# Patient Record
Sex: MALE | Race: WHITE | Employment: OTHER | URBAN - METROPOLITAN AREA
[De-identification: names, ages, dates, MRNs, and addresses within clinical notes are randomized per-mention and may not be internally consistent; named-entity substitution may affect disease eponyms.]

---

## 2022-12-10 ENCOUNTER — APPOINTMENT (OUTPATIENT)
Dept: GENERAL RADIOLOGY | Age: 72
DRG: 247 | End: 2022-12-10
Attending: EMERGENCY MEDICINE
Payer: MEDICARE

## 2022-12-10 ENCOUNTER — HOSPITAL ENCOUNTER (INPATIENT)
Age: 72
LOS: 4 days | Discharge: HOME | DRG: 247 | End: 2022-12-14
Attending: EMERGENCY MEDICINE | Admitting: INTERNAL MEDICINE
Payer: MEDICARE

## 2022-12-10 DIAGNOSIS — R07.9 CHEST PAIN, UNSPECIFIED TYPE: Primary | ICD-10-CM

## 2022-12-10 DIAGNOSIS — I20.0 UNSTABLE ANGINA (HCC): ICD-10-CM

## 2022-12-10 DIAGNOSIS — I35.0 NONRHEUMATIC AORTIC VALVE STENOSIS: ICD-10-CM

## 2022-12-10 DIAGNOSIS — I10 BENIGN ESSENTIAL HTN: ICD-10-CM

## 2022-12-10 DIAGNOSIS — E11.8 DM TYPE 2, CONTROLLED, WITH COMPLICATION (HCC): ICD-10-CM

## 2022-12-10 DIAGNOSIS — R94.39 ABNORMAL STRESS TEST: ICD-10-CM

## 2022-12-10 LAB
ALBUMIN SERPL-MCNC: 3.8 G/DL (ref 3.5–5)
ALBUMIN/GLOB SERPL: 0.9 {RATIO} (ref 1.1–2.2)
ALP SERPL-CCNC: 79 U/L (ref 45–117)
ALT SERPL-CCNC: 18 U/L (ref 12–78)
ANION GAP SERPL CALC-SCNC: 8 MMOL/L (ref 5–15)
AST SERPL-CCNC: 20 U/L (ref 15–37)
BASOPHILS # BLD: 0 K/UL (ref 0–0.1)
BASOPHILS NFR BLD: 0 % (ref 0–1)
BILIRUB SERPL-MCNC: 0.3 MG/DL (ref 0.2–1)
BUN SERPL-MCNC: 29 MG/DL (ref 6–20)
BUN/CREAT SERPL: 15 (ref 12–20)
CALCIUM SERPL-MCNC: 8.7 MG/DL (ref 8.5–10.1)
CHLORIDE SERPL-SCNC: 102 MMOL/L (ref 97–108)
CO2 SERPL-SCNC: 27 MMOL/L (ref 21–32)
CREAT SERPL-MCNC: 1.92 MG/DL (ref 0.7–1.3)
DIFFERENTIAL METHOD BLD: ABNORMAL
EOSINOPHIL # BLD: 0.1 K/UL (ref 0–0.4)
EOSINOPHIL NFR BLD: 2 % (ref 0–7)
ERYTHROCYTE [DISTWIDTH] IN BLOOD BY AUTOMATED COUNT: 13.3 % (ref 11.5–14.5)
GLOBULIN SER CALC-MCNC: 4.2 G/DL (ref 2–4)
GLUCOSE BLD STRIP.AUTO-MCNC: 109 MG/DL (ref 65–117)
GLUCOSE BLD STRIP.AUTO-MCNC: 181 MG/DL (ref 65–117)
GLUCOSE SERPL-MCNC: 176 MG/DL (ref 65–100)
HCT VFR BLD AUTO: 34 % (ref 36.6–50.3)
HGB BLD-MCNC: 11.3 G/DL (ref 12.1–17)
IMM GRANULOCYTES # BLD AUTO: 0 K/UL (ref 0–0.04)
IMM GRANULOCYTES NFR BLD AUTO: 0 % (ref 0–0.5)
LYMPHOCYTES # BLD: 1.3 K/UL (ref 0.8–3.5)
LYMPHOCYTES NFR BLD: 26 % (ref 12–49)
MCH RBC QN AUTO: 31 PG (ref 26–34)
MCHC RBC AUTO-ENTMCNC: 33.2 G/DL (ref 30–36.5)
MCV RBC AUTO: 93.4 FL (ref 80–99)
MONOCYTES # BLD: 0.6 K/UL (ref 0–1)
MONOCYTES NFR BLD: 11 % (ref 5–13)
NEUTS SEG # BLD: 3.1 K/UL (ref 1.8–8)
NEUTS SEG NFR BLD: 61 % (ref 32–75)
NRBC # BLD: 0 K/UL (ref 0–0.01)
NRBC BLD-RTO: 0 PER 100 WBC
PLATELET # BLD AUTO: 266 K/UL (ref 150–400)
PMV BLD AUTO: 10 FL (ref 8.9–12.9)
POTASSIUM SERPL-SCNC: 4.3 MMOL/L (ref 3.5–5.1)
PROT SERPL-MCNC: 8 G/DL (ref 6.4–8.2)
RBC # BLD AUTO: 3.64 M/UL (ref 4.1–5.7)
SERVICE CMNT-IMP: ABNORMAL
SERVICE CMNT-IMP: NORMAL
SODIUM SERPL-SCNC: 137 MMOL/L (ref 136–145)
TROPONIN-HIGH SENSITIVITY: 21 NG/L (ref 0–76)
TROPONIN-HIGH SENSITIVITY: 25 NG/L (ref 0–76)
WBC # BLD AUTO: 5.2 K/UL (ref 4.1–11.1)

## 2022-12-10 PROCEDURE — 93005 ELECTROCARDIOGRAM TRACING: CPT

## 2022-12-10 PROCEDURE — 80053 COMPREHEN METABOLIC PANEL: CPT

## 2022-12-10 PROCEDURE — 74011000250 HC RX REV CODE- 250: Performed by: STUDENT IN AN ORGANIZED HEALTH CARE EDUCATION/TRAINING PROGRAM

## 2022-12-10 PROCEDURE — 71045 X-RAY EXAM CHEST 1 VIEW: CPT

## 2022-12-10 PROCEDURE — 99285 EMERGENCY DEPT VISIT HI MDM: CPT

## 2022-12-10 PROCEDURE — 65270000046 HC RM TELEMETRY

## 2022-12-10 PROCEDURE — 82962 GLUCOSE BLOOD TEST: CPT

## 2022-12-10 PROCEDURE — 84484 ASSAY OF TROPONIN QUANT: CPT

## 2022-12-10 PROCEDURE — 36415 COLL VENOUS BLD VENIPUNCTURE: CPT

## 2022-12-10 PROCEDURE — 85025 COMPLETE CBC W/AUTO DIFF WBC: CPT

## 2022-12-10 RX ORDER — POLYETHYLENE GLYCOL 3350 17 G/17G
17 POWDER, FOR SOLUTION ORAL DAILY PRN
Status: DISCONTINUED | OUTPATIENT
Start: 2022-12-10 | End: 2022-12-14 | Stop reason: HOSPADM

## 2022-12-10 RX ORDER — SODIUM CHLORIDE 9 MG/ML
125 INJECTION, SOLUTION INTRAVENOUS CONTINUOUS
Status: DISCONTINUED | OUTPATIENT
Start: 2022-12-10 | End: 2022-12-14

## 2022-12-10 RX ORDER — NITROGLYCERIN 0.4 MG/1
0.4 TABLET SUBLINGUAL
Status: DISPENSED | OUTPATIENT
Start: 2022-12-10 | End: 2022-12-11

## 2022-12-10 RX ORDER — METFORMIN HYDROCHLORIDE 500 MG/1
TABLET ORAL 2 TIMES DAILY WITH MEALS
COMMUNITY

## 2022-12-10 RX ORDER — GUAIFENESIN 100 MG/5ML
324 LIQUID (ML) ORAL
Status: DISPENSED | OUTPATIENT
Start: 2022-12-10 | End: 2022-12-11

## 2022-12-10 RX ORDER — SODIUM CHLORIDE 0.9 % (FLUSH) 0.9 %
5-40 SYRINGE (ML) INJECTION AS NEEDED
Status: DISCONTINUED | OUTPATIENT
Start: 2022-12-10 | End: 2022-12-14 | Stop reason: HOSPADM

## 2022-12-10 RX ORDER — GLIPIZIDE 5 MG/1
TABLET, FILM COATED, EXTENDED RELEASE ORAL DAILY
COMMUNITY

## 2022-12-10 RX ORDER — AMLODIPINE BESYLATE 5 MG/1
5 TABLET ORAL DAILY
Status: DISCONTINUED | OUTPATIENT
Start: 2022-12-11 | End: 2022-12-14 | Stop reason: HOSPADM

## 2022-12-10 RX ORDER — AMLODIPINE BESYLATE 5 MG/1
5 TABLET ORAL DAILY
COMMUNITY

## 2022-12-10 RX ORDER — RAMIPRIL 5 MG/1
10 CAPSULE ORAL DAILY
COMMUNITY
End: 2022-12-14

## 2022-12-10 RX ORDER — GUAIFENESIN 100 MG/5ML
81 LIQUID (ML) ORAL DAILY
COMMUNITY

## 2022-12-10 RX ORDER — CYANOCOBALAMIN 1000 UG/ML
1000 INJECTION, SOLUTION INTRAMUSCULAR; SUBCUTANEOUS ONCE
COMMUNITY

## 2022-12-10 RX ORDER — ONDANSETRON 2 MG/ML
4 INJECTION INTRAMUSCULAR; INTRAVENOUS
Status: DISCONTINUED | OUTPATIENT
Start: 2022-12-10 | End: 2022-12-14 | Stop reason: HOSPADM

## 2022-12-10 RX ORDER — HYDROCHLOROTHIAZIDE 25 MG/1
25 TABLET ORAL DAILY
COMMUNITY
End: 2022-12-14

## 2022-12-10 RX ORDER — TAMSULOSIN HYDROCHLORIDE 0.4 MG/1
0.4 CAPSULE ORAL DAILY
COMMUNITY

## 2022-12-10 RX ORDER — SODIUM CHLORIDE 0.9 % (FLUSH) 0.9 %
5-40 SYRINGE (ML) INJECTION EVERY 8 HOURS
Status: DISCONTINUED | OUTPATIENT
Start: 2022-12-10 | End: 2022-12-14 | Stop reason: HOSPADM

## 2022-12-10 RX ORDER — GUAIFENESIN 100 MG/5ML
81 LIQUID (ML) ORAL DAILY
Status: DISCONTINUED | OUTPATIENT
Start: 2022-12-11 | End: 2022-12-14 | Stop reason: HOSPADM

## 2022-12-10 RX ORDER — ONDANSETRON 4 MG/1
4 TABLET, ORALLY DISINTEGRATING ORAL
Status: DISCONTINUED | OUTPATIENT
Start: 2022-12-10 | End: 2022-12-14 | Stop reason: HOSPADM

## 2022-12-10 RX ORDER — MAGNESIUM SULFATE 100 %
4 CRYSTALS MISCELLANEOUS AS NEEDED
Status: DISCONTINUED | OUTPATIENT
Start: 2022-12-10 | End: 2022-12-14 | Stop reason: HOSPADM

## 2022-12-10 RX ORDER — EZETIMIBE 10 MG/1
10 TABLET ORAL DAILY
Status: DISCONTINUED | OUTPATIENT
Start: 2022-12-11 | End: 2022-12-14 | Stop reason: HOSPADM

## 2022-12-10 RX ORDER — ACETAMINOPHEN 650 MG/1
650 SUPPOSITORY RECTAL
Status: DISCONTINUED | OUTPATIENT
Start: 2022-12-10 | End: 2022-12-14 | Stop reason: HOSPADM

## 2022-12-10 RX ORDER — TAMSULOSIN HYDROCHLORIDE 0.4 MG/1
0.4 CAPSULE ORAL DAILY
Status: DISCONTINUED | OUTPATIENT
Start: 2022-12-11 | End: 2022-12-14 | Stop reason: HOSPADM

## 2022-12-10 RX ORDER — INSULIN LISPRO 100 [IU]/ML
INJECTION, SOLUTION INTRAVENOUS; SUBCUTANEOUS
Status: DISCONTINUED | OUTPATIENT
Start: 2022-12-10 | End: 2022-12-14 | Stop reason: HOSPADM

## 2022-12-10 RX ORDER — PIOGLITAZONEHYDROCHLORIDE 45 MG/1
TABLET ORAL DAILY
COMMUNITY
End: 2022-12-14

## 2022-12-10 RX ORDER — EZETIMIBE 10 MG/1
TABLET ORAL
COMMUNITY

## 2022-12-10 RX ORDER — ENOXAPARIN SODIUM 100 MG/ML
40 INJECTION SUBCUTANEOUS DAILY
Status: DISCONTINUED | OUTPATIENT
Start: 2022-12-11 | End: 2022-12-11

## 2022-12-10 RX ORDER — ACETAMINOPHEN 325 MG/1
650 TABLET ORAL
Status: DISCONTINUED | OUTPATIENT
Start: 2022-12-10 | End: 2022-12-14 | Stop reason: HOSPADM

## 2022-12-10 RX ADMIN — SODIUM CHLORIDE, PRESERVATIVE FREE 10 ML: 5 INJECTION INTRAVENOUS at 22:51

## 2022-12-10 NOTE — ED TRIAGE NOTES
Patient arrives with c/o chest pain early in the week, another one yesterday and one today that radiates to both arms. Patient had ECHO Thursday but does not have results yet. + SOB when the pain comes on with dizziness. <<-----Click on this checkbox to enter Post-Op Dx

## 2022-12-10 NOTE — ED NOTES
Patient and wife state that patient took \"1 baby aspirin this morning\" and \"full dose aspirin around 145pm, right before coming here\". Patient denies Chest pain at this time. Nitro and ASA held.

## 2022-12-10 NOTE — ED PROVIDER NOTES
72M w/ hx HTN and DM p/w 1d chest pain. Pt reports 1d of left sided chest tightness radiating to both arms associated w/ nausea and lightheadedness that started while doing yardwork. Also having dyspnea. Currently has mild 2/10 pain. No F/C, cough, V/D, syncope. Denies any prior cardiac hx. No recent surgeries, hospitalizations, travel, hx of malignancy, exogenous estrogen use, hemoptysis, LE pain/swelling, or hx of PE/DVT. No hx of smoking. At home took a full dose as       Past Medical History:   Diagnosis Date    Diabetes ([facility], [place] [address]     Hepatitis 1959    not active    Hypertension     Kidney stones     Malaria 1969    while serving in [facility]       Past Surgical History:   Procedure Laterality Date    HX KNEE REPLACEMENT Right 03/05/2019    Dr. aKela Hart         History reviewed. No pertinent family history. Social History     Socioeconomic History    Marital status:      Spouse name: Not on file    Number of children: Not on file    Years of education: Not on file    Highest education level: Not on file   Occupational History    Not on file   Tobacco Use    Smoking status: Never    Smokeless tobacco: Never   Substance and Sexual Activity    Alcohol use: Never    Drug use: Never    Sexual activity: Not on file   Other Topics Concern    Not on file   Social History Narrative    Not on file     Social Determinants of Health     Financial Resource Strain: Not on file   Food Insecurity: Not on file   Transportation Needs: Not on file   Physical Activity: Not on file   Stress: Not on file   Social Connections: Not on file   Intimate Partner Violence: Not on file   Housing Stability: Not on file         ALLERGIES: Patient has no known allergies. Review of Systems   Constitutional:  Negative for chills, diaphoresis and fever. HENT:  Negative for facial swelling, mouth sores, nosebleeds, trouble swallowing and voice change. Eyes:  Negative for pain and visual disturbance.    Respiratory:  Positive for shortness of breath. Negative for apnea, cough, wheezing and stridor. Cardiovascular:  Positive for chest pain. Negative for palpitations and leg swelling. Gastrointestinal:  Positive for nausea. Negative for abdominal distention, abdominal pain, blood in stool, diarrhea and vomiting. Genitourinary:  Negative for difficulty urinating, dysuria, flank pain, hematuria, scrotal swelling and testicular pain. Musculoskeletal:  Negative for joint swelling. Skin:  Negative for color change and rash. Allergic/Immunologic: Negative for immunocompromised state. Neurological:  Negative for dizziness, syncope and light-headedness. Hematological:  Does not bruise/bleed easily. Psychiatric/Behavioral:  Negative for agitation and behavioral problems. Vitals:    12/11/22 0300 12/11/22 0400 12/11/22 0600 12/11/22 0705   BP: (!) 149/70   (!) 121/46   Pulse: 78 80 (!) 3 81   Resp: 18   18   Temp: 97.7 °F (36.5 °C)   97.7 °F (36.5 °C)   SpO2: 94%   95%   Weight:                Physical Exam  Vitals and nursing note reviewed. Constitutional:       General: He is not in acute distress. Appearance: Normal appearance. He is not ill-appearing or toxic-appearing. HENT:      Head: Normocephalic and atraumatic. Right Ear: External ear normal.      Left Ear: External ear normal.      Nose: Nose normal.      Mouth/Throat:      Mouth: Mucous membranes are moist.      Pharynx: Oropharynx is clear. No oropharyngeal exudate or posterior oropharyngeal erythema. Eyes:      General: No scleral icterus. Extraocular Movements: Extraocular movements intact. Conjunctiva/sclera: Conjunctivae normal.      Pupils: Pupils are equal, round, and reactive to light. Cardiovascular:      Rate and Rhythm: Normal rate and regular rhythm. Pulses: Normal pulses. Heart sounds: No murmur heard. No friction rub. No gallop. Pulmonary:      Effort: Pulmonary effort is normal. No respiratory distress. Breath sounds: Normal breath sounds. No stridor. No wheezing, rhonchi or rales. Abdominal:      General: Bowel sounds are normal. There is no distension. Palpations: Abdomen is soft. Tenderness: There is no abdominal tenderness. There is no guarding or rebound. Musculoskeletal:         General: No deformity. Normal range of motion. Cervical back: Normal range of motion and neck supple. No rigidity. Right lower leg: No edema. Left lower leg: No edema. Skin:     General: Skin is warm. Capillary Refill: Capillary refill takes less than 2 seconds. Coloration: Skin is not jaundiced. Neurological:      General: No focal deficit present. Mental Status: He is alert. Cranial Nerves: No cranial nerve deficit. Sensory: No sensory deficit. Motor: No weakness. Coordination: Coordination normal.   Psychiatric:         Mood and Affect: Mood normal.         Behavior: Behavior normal.         Thought Content:  Thought content normal.         Judgment: Judgment normal.        EKG Interpretation   SR, narrow QRS, nl intervals, no EDGAR but inferior-lateral STD w/ TWI  (EKG tracing interpreted by ED physician)    EKG Interpretation  SR, narrow QRS, nl intervals, no EDGAR or EDGAR    EKG tracing interpreted by ED physician      LABORATORY TESTS:  Admission on 12/10/2022   Component Date Value Ref Range Status    Ventricular Rate 12/10/2022 91  BPM Preliminary    Atrial Rate 12/10/2022 92  BPM Preliminary    P-R Interval 12/10/2022 140  ms Preliminary    QRS Duration 12/10/2022 96  ms Preliminary    Q-T Interval 12/10/2022 336  ms Preliminary    QTC Calculation (Bezet) 12/10/2022 413  ms Preliminary    Calculated P Axis 12/10/2022 64  degrees Preliminary    Calculated R Axis 12/10/2022 27  degrees Preliminary    Calculated T Axis 12/10/2022 136  degrees Preliminary    Diagnosis 12/10/2022    Preliminary                    Value:Normal sinus rhythm  Cannot rule out Inferior infarct , age undetermined  ST & T wave abnormality, consider lateral ischemia  Abnormal ECG  No previous ECGs available      WBC 12/10/2022 5.2  4.1 - 11.1 K/uL Final    RBC 12/10/2022 3.64 (A)  4.10 - 5.70 M/uL Final    HGB 12/10/2022 11.3 (A)  12.1 - 17.0 g/dL Final    HCT 12/10/2022 34.0 (A)  36.6 - 50.3 % Final    MCV 12/10/2022 93.4  80.0 - 99.0 FL Final    MCH 12/10/2022 31.0  26.0 - 34.0 PG Final    MCHC 12/10/2022 33.2  30.0 - 36.5 g/dL Final    RDW 12/10/2022 13.3  11.5 - 14.5 % Final    PLATELET 25/39/7769 830  150 - 400 K/uL Final    MPV 12/10/2022 10.0  8.9 - 12.9 FL Final    NRBC 12/10/2022 0.0  0  WBC Final    ABSOLUTE NRBC 12/10/2022 0.00  0.00 - 0.01 K/uL Final    NEUTROPHILS 12/10/2022 61  32 - 75 % Final    LYMPHOCYTES 12/10/2022 26  12 - 49 % Final    MONOCYTES 12/10/2022 11  5 - 13 % Final    EOSINOPHILS 12/10/2022 2  0 - 7 % Final    BASOPHILS 12/10/2022 0  0 - 1 % Final    IMMATURE GRANULOCYTES 12/10/2022 0  0.0 - 0.5 % Final    ABS. NEUTROPHILS 12/10/2022 3.1  1.8 - 8.0 K/UL Final    ABS. LYMPHOCYTES 12/10/2022 1.3  0.8 - 3.5 K/UL Final    ABS. MONOCYTES 12/10/2022 0.6  0.0 - 1.0 K/UL Final    ABS. EOSINOPHILS 12/10/2022 0.1  0.0 - 0.4 K/UL Final    ABS. BASOPHILS 12/10/2022 0.0  0.0 - 0.1 K/UL Final    ABS. IMM. GRANS.  12/10/2022 0.0  0.00 - 0.04 K/UL Final    DF 12/10/2022 AUTOMATED    Final    Sodium 12/10/2022 137  136 - 145 mmol/L Final    Potassium 12/10/2022 4.3  3.5 - 5.1 mmol/L Final    Chloride 12/10/2022 102  97 - 108 mmol/L Final    CO2 12/10/2022 27  21 - 32 mmol/L Final    Anion gap 12/10/2022 8  5 - 15 mmol/L Final    Glucose 12/10/2022 176 (A)  65 - 100 mg/dL Final    BUN 12/10/2022 29 (A)  6 - 20 MG/DL Final    Creatinine 12/10/2022 1.92 (A)  0.70 - 1.30 MG/DL Final    BUN/Creatinine ratio 12/10/2022 15  12 - 20   Final    eGFR 12/10/2022 36 (A)  >60 ml/min/1.73m2 Final    Comment:      Pediatric calculator link: Bela.at. org/professionals/kdoqi/gfr_calculatorped       These results are not intended for use in patients <25years of age. eGFR results are calculated without a race factor using  the 2021 CKD-EPI equation. Careful clinical correlation is recommended, particularly when comparing to results calculated using previous equations. The CKD-EPI equation is less accurate in patients with extremes of muscle mass, extra-renal metabolism of creatinine, excessive creatine ingestion, or following therapy that affects renal tubular secretion. Calcium 12/10/2022 8.7  8.5 - 10.1 MG/DL Final    Bilirubin, total 12/10/2022 0.3  0.2 - 1.0 MG/DL Final    ALT (SGPT) 12/10/2022 18  12 - 78 U/L Final    AST (SGOT) 12/10/2022 20  15 - 37 U/L Final    Alk. phosphatase 12/10/2022 79  45 - 117 U/L Final    Protein, total 12/10/2022 8.0  6.4 - 8.2 g/dL Final    Albumin 12/10/2022 3.8  3.5 - 5.0 g/dL Final    Globulin 12/10/2022 4.2 (A)  2.0 - 4.0 g/dL Final    A-G Ratio 12/10/2022 0.9 (A)  1.1 - 2.2   Final    Troponin-High Sensitivity 12/10/2022 21  0 - 76 ng/L Final    Comment: A HS troponin value change of (+ or -) 50% or more below the 99th percentile, in a 1/2/3 hr interval represents a significant change. Clinical correcation is recommended. A HS troponin value change of (+ or -) 20% or above the 99th percentile, in a 1/2/3 hr interval represents a significant change. Clinical correlation is recommended.   99th Percentile:   Women: 0-51 ng/L                                                                Men:   0-76 ng/L  Patients taking more than 20 mg/day of biotin may havefalsely negative results and should not use this test.      Ventricular Rate 12/10/2022 85  BPM Preliminary    Atrial Rate 12/10/2022 87  BPM Preliminary    QRS Duration 12/10/2022 94  ms Preliminary    Q-T Interval 12/10/2022 370  ms Preliminary    QTC Calculation (Bezet) 12/10/2022 440  ms Preliminary    Calculated R Axis 12/10/2022 22 degrees Preliminary    Calculated T Axis 12/10/2022 28  degrees Preliminary    Diagnosis 12/10/2022    Preliminary                    Value:Accelerated Junctional rhythm  Possible Inferior infarct , age undetermined  Abnormal ECG  When compared with ECG of 10-DEC-2022 14:51,  MANUAL COMPARISON REQUIRED, DATA IS UNCONFIRMED      Glucose (POC) 12/10/2022 109  65 - 117 mg/dL Final    Comment: (NOTE)  The FDA has indicated that no capillary point of care blood glucose  monitoring systems are approved for use in \"critically ill\" patients,  however they have not defined this population. The College of  American Pathologists has recommended that these devices should not  be used in cases such as severe hypotension, dehydration, shock, and  hyperglycemic-hyperosmolar state, amongst others. Venous or arterial  collection is the recommended specimen for testing these patients. Performed by 12/10/2022 Brianne Platt RN   Final    Troponin-High Sensitivity 12/10/2022 25  0 - 76 ng/L Final    Comment: A HS troponin value change of (+ or -) 50% or more below the 99th percentile, in a 1/2/3 hr interval represents a significant change. Clinical correcation is recommended. A HS troponin value change of (+ or -) 20% or above the 99th percentile, in a 1/2/3 hr interval represents a significant change. Clinical correlation is recommended. 99th Percentile:   Women: 0-51 ng/L                                                                Men:   0-76 ng/L  Patients taking more than 20 mg/day of biotin may havefalsely negative results and should not use this test.      Glucose (POC) 12/10/2022 181 (A)  65 - 117 mg/dL Final    Comment: (NOTE)  The FDA has indicated that no capillary point of care blood glucose  monitoring systems are approved for use in \"critically ill\" patients,  however they have not defined this population.  The College of  American Pathologists has recommended that these devices should not  be used in cases such as severe hypotension, dehydration, shock, and  hyperglycemic-hyperosmolar state, amongst others. Venous or arterial  collection is the recommended specimen for testing these patients. Performed by 12/10/2022 Bruce Barba (PCT)   Final    Sodium 12/11/2022 136  136 - 145 mmol/L Final    Potassium 12/11/2022 4.3  3.5 - 5.1 mmol/L Final    Chloride 12/11/2022 106  97 - 108 mmol/L Final    CO2 12/11/2022 28  21 - 32 mmol/L Final    Anion gap 12/11/2022 2 (A)  5 - 15 mmol/L Final    Glucose 12/11/2022 182 (A)  65 - 100 mg/dL Final    BUN 12/11/2022 28 (A)  6 - 20 MG/DL Final    Creatinine 12/11/2022 1.65 (A)  0.70 - 1.30 MG/DL Final    BUN/Creatinine ratio 12/11/2022 17  12 - 20   Final    eGFR 12/11/2022 43 (A)  >60 ml/min/1.73m2 Final    Comment:      Pediatric calculator link: wishkicker.at. org/professionals/kdoqi/gfr_calculatorped       These results are not intended for use in patients <25years of age. eGFR results are calculated without a race factor using  the 2021 CKD-EPI equation. Careful clinical correlation is recommended, particularly when comparing to results calculated using previous equations. The CKD-EPI equation is less accurate in patients with extremes of muscle mass, extra-renal metabolism of creatinine, excessive creatine ingestion, or following therapy that affects renal tubular secretion.       Calcium 12/11/2022 8.5  8.5 - 10.1 MG/DL Final    Hemoglobin A1c 12/11/2022 6.0 (A)  4.0 - 5.6 % Final    Comment: NEW METHOD  PLEASE NOTE NEW REFERENCE RANGE  (NOTE)  HbA1C Interpretive Ranges  <5.7              Normal  5.7 - 6.4         Consider Prediabetes  >6.5              Consider Diabetes      Est. average glucose 12/11/2022 126  mg/dL Final    Glucose (POC) 12/11/2022 116  65 - 117 mg/dL Final    Comment: (NOTE)  The FDA has indicated that no capillary point of care blood glucose  monitoring systems are approved for use in \"critically ill\" patients,  however they have not defined this population. The College of  American Pathologists has recommended that these devices should not  be used in cases such as severe hypotension, dehydration, shock, and  hyperglycemic-hyperosmolar state, amongst others. Venous or arterial  collection is the recommended specimen for testing these patients. Performed by 12/11/2022 Angel Padilla (PCT)   Final       IMAGING RESULTS:  XR CHEST PORT   Final Result   No acute process.           MEDICATIONS GIVEN:  Medications   aspirin chewable tablet 324 mg (0 mg Oral Held 12/10/22 1555)   nitroglycerin (NITROSTAT) tablet 0.4 mg (0 mg SubLINGual Held 12/10/22 1556)   aspirin chewable tablet 81 mg (has no administration in time range)   amLODIPine (NORVASC) tablet 5 mg (has no administration in time range)   ezetimibe (ZETIA) tablet 10 mg (has no administration in time range)   tamsulosin (FLOMAX) capsule 0.4 mg (has no administration in time range)   sodium chloride (NS) flush 5-40 mL ( IntraVENous Canceled Entry 12/11/22 0600)   sodium chloride (NS) flush 5-40 mL (has no administration in time range)   acetaminophen (TYLENOL) tablet 650 mg (has no administration in time range)     Or   acetaminophen (TYLENOL) suppository 650 mg (has no administration in time range)   polyethylene glycol (MIRALAX) packet 17 g (has no administration in time range)   ondansetron (ZOFRAN ODT) tablet 4 mg (has no administration in time range)     Or   ondansetron (ZOFRAN) injection 4 mg (has no administration in time range)   enoxaparin (LOVENOX) injection 40 mg (has no administration in time range)   0.9% sodium chloride infusion (125 mL/hr IntraVENous New Bag 12/11/22 0610)   glucose chewable tablet 16 g (has no administration in time range)   glucagon (GLUCAGEN) injection 1 mg (has no administration in time range)   dextrose 10 % infusion 0-250 mL (has no administration in time range)   insulin lispro (HUMALOG) injection (0 Units SubCUTAneous Held 12/11/22 0730) IMPRESSION:  1. Chest pain, unspecified type        PLAN:  - Admit to hospitalist    Briana Schilling MD      MDM  Number of Diagnoses or Management Options  Chest pain, unspecified type  Diagnosis management comments: 72M w/ hx HTN and DM p/w 1d chest pain. Pt hemodynamically stable w/o resp distress or hypoxia. Ddx includes ACS vs cardiac dysrythmia vs pericarditis vs PNX vs PNA vs bronchitis/COPD/asthma vs CHF vs severe anemia vs HTN emergency/urgency vs gastritis/PUD/GERd vs pleurisy vs costochondritis, less likely PE based on Wells/geneva score and no tachycardia/hypoxia, much less likely esophageal rupture or AD/TAA based on presentation. Ordered Cxr, EKG, labs. Monitor and reassess. 1700 Initial EKG showed SR w/o EDGAR but inferior-lateral STD w/ TWI. Repeat EKG looks improved. CXR unremarkable. Trop neg x1. Labs show elevated Cr 1.9 (unknown baseline). Pt took full dose asa at home. SL NTG ordered for pain but now pain free in ED. Admitting for ACS work up given concerning hx, risk factors and abnl EKG. Amount and/or Complexity of Data Reviewed  Clinical lab tests: reviewed and ordered  Tests in the radiology section of CPT®: ordered and reviewed  Tests in the medicine section of CPT®: ordered and reviewed  Discussion of test results with the performing providers: yes  Discuss the patient with other providers: yes  Independent visualization of images, tracings, or specimens: yes    Risk of Complications, Morbidity, and/or Mortality  Presenting problems: moderate  Diagnostic procedures: moderate  Management options: moderate           Procedures             Perfect Serve Consult for Admission  4:17 PM    ED Room Number: 456/01  Patient Name and age:  Flash Alexis 74 y.o.  male  Working Diagnosis:   1.  Chest pain, unspecified type        COVID-19 Suspicion:  no  Sepsis present:  no  Reassessment needed: no  Code Status:  Full Code  Readmission: no  Isolation Requirements:  no  Recommended Level of Care:  telemetry  Department: [place]

## 2022-12-11 LAB
ANION GAP SERPL CALC-SCNC: 2 MMOL/L (ref 5–15)
BUN SERPL-MCNC: 28 MG/DL (ref 6–20)
BUN/CREAT SERPL: 17 (ref 12–20)
CALCIUM SERPL-MCNC: 8.5 MG/DL (ref 8.5–10.1)
CHLORIDE SERPL-SCNC: 106 MMOL/L (ref 97–108)
CO2 SERPL-SCNC: 28 MMOL/L (ref 21–32)
CREAT SERPL-MCNC: 1.65 MG/DL (ref 0.7–1.3)
EST. AVERAGE GLUCOSE BLD GHB EST-MCNC: 126 MG/DL
GLUCOSE BLD STRIP.AUTO-MCNC: 105 MG/DL (ref 65–117)
GLUCOSE BLD STRIP.AUTO-MCNC: 107 MG/DL (ref 65–117)
GLUCOSE BLD STRIP.AUTO-MCNC: 116 MG/DL (ref 65–117)
GLUCOSE BLD STRIP.AUTO-MCNC: 132 MG/DL (ref 65–117)
GLUCOSE SERPL-MCNC: 182 MG/DL (ref 65–100)
HBA1C MFR BLD: 6 % (ref 4–5.6)
POTASSIUM SERPL-SCNC: 4.3 MMOL/L (ref 3.5–5.1)
SERVICE CMNT-IMP: ABNORMAL
SERVICE CMNT-IMP: NORMAL
SODIUM SERPL-SCNC: 136 MMOL/L (ref 136–145)

## 2022-12-11 PROCEDURE — 36415 COLL VENOUS BLD VENIPUNCTURE: CPT

## 2022-12-11 PROCEDURE — 82962 GLUCOSE BLOOD TEST: CPT

## 2022-12-11 PROCEDURE — 74011000250 HC RX REV CODE- 250: Performed by: STUDENT IN AN ORGANIZED HEALTH CARE EDUCATION/TRAINING PROGRAM

## 2022-12-11 PROCEDURE — 83036 HEMOGLOBIN GLYCOSYLATED A1C: CPT

## 2022-12-11 PROCEDURE — 74011250637 HC RX REV CODE- 250/637: Performed by: STUDENT IN AN ORGANIZED HEALTH CARE EDUCATION/TRAINING PROGRAM

## 2022-12-11 PROCEDURE — 65270000046 HC RM TELEMETRY

## 2022-12-11 PROCEDURE — 80048 BASIC METABOLIC PNL TOTAL CA: CPT

## 2022-12-11 PROCEDURE — 74011250636 HC RX REV CODE- 250/636: Performed by: STUDENT IN AN ORGANIZED HEALTH CARE EDUCATION/TRAINING PROGRAM

## 2022-12-11 RX ORDER — ENOXAPARIN SODIUM 100 MG/ML
30 INJECTION SUBCUTANEOUS EVERY 12 HOURS
Status: COMPLETED | OUTPATIENT
Start: 2022-12-12 | End: 2022-12-12

## 2022-12-11 RX ADMIN — SODIUM CHLORIDE, PRESERVATIVE FREE 10 ML: 5 INJECTION INTRAVENOUS at 14:00

## 2022-12-11 RX ADMIN — EZETIMIBE 10 MG: 10 TABLET ORAL at 09:10

## 2022-12-11 RX ADMIN — ASPIRIN 81 MG: 81 TABLET, CHEWABLE ORAL at 09:10

## 2022-12-11 RX ADMIN — SODIUM CHLORIDE 125 ML/HR: 9 INJECTION, SOLUTION INTRAVENOUS at 06:10

## 2022-12-11 RX ADMIN — SODIUM CHLORIDE 125 ML/HR: 9 INJECTION, SOLUTION INTRAVENOUS at 16:23

## 2022-12-11 RX ADMIN — ENOXAPARIN SODIUM 40 MG: 100 INJECTION SUBCUTANEOUS at 09:10

## 2022-12-11 RX ADMIN — TAMSULOSIN HYDROCHLORIDE 0.4 MG: 0.4 CAPSULE ORAL at 09:10

## 2022-12-11 RX ADMIN — AMLODIPINE BESYLATE 5 MG: 5 TABLET ORAL at 09:10

## 2022-12-11 RX ADMIN — SODIUM CHLORIDE, PRESERVATIVE FREE 10 ML: 5 INJECTION INTRAVENOUS at 21:10

## 2022-12-11 NOTE — PROGRESS NOTES
2100 TRANSFER - IN REPORT:    Verbal report received from [address], RN(name) on Joan Osman  being received from [place] ED(unit) for routine progression of care      Report consisted of patients Situation, Background, Assessment and   Recommendations(SBAR). Information from the following report(s) SBAR, Kardex, ED Summary, MAR, and Alarm Parameters  was reviewed with the receiving nurse. Opportunity for questions and clarification was provided. Assessment completed upon patients arrival to unit and care assumed. 2120 spoke with admitting MD Cohen and Pt has been assigned to MD [facility], awaiting admitting orders at this time.

## 2022-12-11 NOTE — ED NOTES
TRANSFER - OUT REPORT:    Verbal report given to Gayle Escamilla RN (name) on Shamir Mock  being transferred to [facility](unit) for routine progression of care       Report consisted of patients Situation, Background, Assessment and   Recommendations(SBAR). Information from the following report(s) SBAR, ED Summary, MAR, and Cardiac Rhythm NSR  was reviewed with the receiving nurse. Lines:       Opportunity for questions and clarification was provided.       Patient transported with:   Monitor

## 2022-12-11 NOTE — H&P
History & Physical    Primary Care Provider: John Mckenna MD  Source of Information: Patient and chart review    History of Presenting Illness:   Shamir Benavides is a 74 y.o. male with hx of niddm ii, htn, dyslipidemia, bph, obesity who presented to ed with complaints of chest pain. Pt endorses intermittent episodes of chest pain over the last 5 months. Reports pressure-like chest pain occurring bi-weekly with radiation down both his arms. Other associated symptoms have included sob, malaise and fatigue  The patient currently feel well and denies any ongoing fever, chills, chest or abdominal pain, nausea, vomiting, cough, congestion, recent illness, palpitations, or dysuria. Remarkable vitals on ER Presentations: vss  Labs Remarkable for: cr 1.92, flu 176,   ER Images: cxr: no acute process  ER Rx: none     Review of Systems:  Pertinent items are noted in the History of Present Illness. Past Medical History:   Diagnosis Date    Diabetes ([facility] [address]     Hepatitis 1959    not active    Hypertension     Kidney stones     Malaria 1969    while serving in [facility]      Past Surgical History:   Procedure Laterality Date    HX KNEE REPLACEMENT Right 03/05/2019    Dr. Nasra Cobian     Prior to Admission medications    Medication Sig Start Date End Date Taking? Authorizing Provider   metFORMIN (GLUCOPHAGE) 500 mg tablet Take  by mouth two (2) times daily (with meals). Yes Bridger, MD Cristal   ramipriL (ALTACE) 5 mg capsule Take 10 mg by mouth daily. Yes Cristal Sparks MD   ezetimibe (ZETIA) 10 mg tablet Take  by mouth. Yes Cristal Sparks MD   hydroCHLOROthiazide (HYDRODIURIL) 25 mg tablet Take 25 mg by mouth daily. Yes Bridger, MD Cristal   tamsulosin (FLOMAX) 0.4 mg capsule Take 0.4 mg by mouth daily. Yes Bridger, MD Cristal   pioglitazone (ACTOS) 45 mg tablet Take  by mouth daily. Yes Cristal Sparks MD   amLODIPine (NORVASC) 5 mg tablet Take 5 mg by mouth daily.    Yes Cristal Sparks MD glipiZIDE SR (GLUCOTROL XL) 5 mg CR tablet Take  by mouth daily. Yes Other, MD Cristal   aspirin 81 mg chewable tablet Take 81 mg by mouth daily. Yes Bridger, MD Cristal   cyanocobalamin (VITAMIN B12) 1,000 mcg/mL injection 1,000 mcg by IntraMUSCular route once. Yes Other, MD Cristal     No Known Allergies   History reviewed. No pertinent family history. SOCIAL HISTORY:  Patient resides:  Independently x   Assisted Living    SNF    With family care       Smoking history:   None x   Former    Chronic      Alcohol history:   None x   Social    Chronic      Ambulates:   Independently x   w/cane    w/walker    w/wc    CODE STATUS:  DNR    Full x   Other      Objective:     Physical Exam:     Visit Vitals  BP (!) 154/54 (BP 1 Location: Right arm, BP Patient Position: Sitting)   Pulse 82   Temp 97.7 °F (36.5 °C)   Resp 19   Wt 113.2 kg (249 lb 9 oz)   SpO2 98%      O2 Device: None (Room air)    General:  Alert, cooperative, no distress, appears stated age. Head:  Normocephalic, without obvious abnormality, atraumatic. Eyes:  Conjunctivae/corneas clear. PERRL, EOMs intact. Nose: Nares normal. Septum midline. Mucosa normal.        Neck: Supple, symmetrical, trachea midline, no carotid bruit and no JVD. Lungs:   Clear to auscultation bilaterally. Chest wall:  No tenderness or deformity. Heart:  Regular rate and rhythm, S1, S2 normal, 3/6 pansystolic murmur, click, rub or gallop. Abdomen:   Soft, non-tender. Obese abdomen. Bowel sounds normal. No masses,  No organomegaly. Extremities: Extremities normal, atraumatic, no cyanosis or edema. Pulses: 2+ and symmetric all extremities. Skin: Skin color, texture, turgor normal. No rashes or lesions   Neurologic: CNII-XII intact. EKG:  nsr. Non-specific st and t wave changes.   Data Review:     Recent Days:  Recent Labs     12/10/22  1453   WBC 5.2   HGB 11.3*   HCT 34.0*        Recent Labs     12/10/22  1453      K 4.3      CO2 27   *   BUN 29*   CREA 1.92*   CA 8.7   ALB 3.8   ALT 18     No results for input(s): PH, PCO2, PO2, HCO3, FIO2 in the last 72 hours. 24 Hour Results:  Recent Results (from the past 24 hour(s))   EKG, 12 LEAD, INITIAL    Collection Time: 12/10/22  2:51 PM   Result Value Ref Range    Ventricular Rate 91 BPM    Atrial Rate 92 BPM    P-R Interval 140 ms    QRS Duration 96 ms    Q-T Interval 336 ms    QTC Calculation (Bezet) 413 ms    Calculated P Axis 64 degrees    Calculated R Axis 27 degrees    Calculated T Axis 136 degrees    Diagnosis       Normal sinus rhythm  Cannot rule out Inferior infarct , age undetermined  ST & T wave abnormality, consider lateral ischemia  Abnormal ECG  No previous ECGs available     CBC WITH AUTOMATED DIFF    Collection Time: 12/10/22  2:53 PM   Result Value Ref Range    WBC 5.2 4.1 - 11.1 K/uL    RBC 3.64 (L) 4.10 - 5.70 M/uL    HGB 11.3 (L) 12.1 - 17.0 g/dL    HCT 34.0 (L) 36.6 - 50.3 %    MCV 93.4 80.0 - 99.0 FL    MCH 31.0 26.0 - 34.0 PG    MCHC 33.2 30.0 - 36.5 g/dL    RDW 13.3 11.5 - 14.5 %    PLATELET 816 723 - 157 K/uL    MPV 10.0 8.9 - 12.9 FL    NRBC 0.0 0  WBC    ABSOLUTE NRBC 0.00 0.00 - 0.01 K/uL    NEUTROPHILS 61 32 - 75 %    LYMPHOCYTES 26 12 - 49 %    MONOCYTES 11 5 - 13 %    EOSINOPHILS 2 0 - 7 %    BASOPHILS 0 0 - 1 %    IMMATURE GRANULOCYTES 0 0.0 - 0.5 %    ABS. NEUTROPHILS 3.1 1.8 - 8.0 K/UL    ABS. LYMPHOCYTES 1.3 0.8 - 3.5 K/UL    ABS. MONOCYTES 0.6 0.0 - 1.0 K/UL    ABS. EOSINOPHILS 0.1 0.0 - 0.4 K/UL    ABS. BASOPHILS 0.0 0.0 - 0.1 K/UL    ABS. IMM.  GRANS. 0.0 0.00 - 0.04 K/UL    DF AUTOMATED     METABOLIC PANEL, COMPREHENSIVE    Collection Time: 12/10/22  2:53 PM   Result Value Ref Range    Sodium 137 136 - 145 mmol/L    Potassium 4.3 3.5 - 5.1 mmol/L    Chloride 102 97 - 108 mmol/L    CO2 27 21 - 32 mmol/L    Anion gap 8 5 - 15 mmol/L    Glucose 176 (H) 65 - 100 mg/dL    BUN 29 (H) 6 - 20 MG/DL    Creatinine 1.92 (H) 0.70 - 1.30 MG/DL BUN/Creatinine ratio 15 12 - 20      eGFR 36 (L) >60 ml/min/1.73m2    Calcium 8.7 8.5 - 10.1 MG/DL    Bilirubin, total 0.3 0.2 - 1.0 MG/DL    ALT (SGPT) 18 12 - 78 U/L    AST (SGOT) 20 15 - 37 U/L    Alk. phosphatase 79 45 - 117 U/L    Protein, total 8.0 6.4 - 8.2 g/dL    Albumin 3.8 3.5 - 5.0 g/dL    Globulin 4.2 (H) 2.0 - 4.0 g/dL    A-G Ratio 0.9 (L) 1.1 - 2.2     TROPONIN-HIGH SENSITIVITY    Collection Time: 12/10/22  2:53 PM   Result Value Ref Range    Troponin-High Sensitivity 21 0 - 76 ng/L   EKG, 12 LEAD, SUBSEQUENT    Collection Time: 12/10/22  3:38 PM   Result Value Ref Range    Ventricular Rate 85 BPM    Atrial Rate 87 BPM    QRS Duration 94 ms    Q-T Interval 370 ms    QTC Calculation (Bezet) 440 ms    Calculated R Axis 22 degrees    Calculated T Axis 28 degrees    Diagnosis       Accelerated Junctional rhythm  Possible Inferior infarct , age undetermined  Abnormal ECG  When compared with ECG of 10-DEC-2022 14:51,  MANUAL COMPARISON REQUIRED, DATA IS UNCONFIRMED     GLUCOSE, POC    Collection Time: 12/10/22  6:53 PM   Result Value Ref Range    Glucose (POC) 109 65 - 117 mg/dL    Performed by Donna Lou RN          Imaging:     Assessment:     Ana Laura Flores is a 74 y.o. male with hx of niddm ii, htn, dyslipidemia, bph, obesity who is admitted for stable angina.        Plan:       Stable Angina  -c/w serial trops  -lexiscan in am if available  -c/w daily asa  -check uds, tsh, lipid panel, a1c     IDDM II  -SSI +Hypoglycemic protocols    MARISEL vs CKD  -mivf  -hold losartan    BPH  -home flomax    Dyslipidemia  -c/w home zetia    Obesity  -Counseled on weight loss, dieting and exercise          FEN/GI -  [email or web address]  Activity - as tolerated  DVT prophylaxis - lovenox  GI prophylaxis -  NI  Disposition - home    CODE STATUS:  full code       Signed By: Ga Crabtree MD     December 10, 2022

## 2022-12-11 NOTE — PROGRESS NOTES
[address] [facility]  Hospitalist Group                                                                                          Hospitalist Progress Note  Naman Vu MD  Answering service: [phone] from in house phone        Date of Service:  2022  NAME:  Marquise Magallon  :  1950  MRN:  [id number]      Admission Summary:   Marquise Magallon is a 74 y.o. male with hx of niddm ii, htn, dyslipidemia, bph, obesity who presented to ed with complaints of chest pain. Pt endorses intermittent episodes of chest pain over the last 5 months. Reports pressure-like chest pain occurring bi-weekly with radiation down both his arms. Other associated symptoms have included sob, malaise and fatigue  The patient currently feel well and denies any ongoing fever, chills, chest or abdominal pain, nausea, vomiting, cough, congestion, recent illness, palpitations, or dysuria. Remarkable vitals on ER Presentations: vss  Labs Remarkable for: cr 1.92, flu 176,   ER Images: cxr: no acute process  ER Rx: none       Interval history / Subjective:    Follow up Chest pain   No more chest pain, SOB, dizziness  Whenever has \"the spell\" has SOB, dizziness with pain in shoulders and arms  Usually \"the spells\" happen when he is working or doing something  Assessment & Plan:     Unstable Angina  -c/w serial trops  -lexiscan in am if available  -c/w daily asa  -check uds, tsh, lipid panel, a1c      IDDM II: SSI +Hypoglycemic protocols  MARISEL vs CKD: mivf. hold losartan  BPH: home flomax  Dyslipidemia: c/w home zetia  Obesity:Counseled on weight loss, dieting and exercise         NPO  -will et the patient patient have diet       Code status: FULL CODE  Prophylaxis: Lovenox    Plan: Follow Cardiology, echo results from [address] discussed with: patient  Anticipated Disposition: home     Hospital Problems  Never Reviewed            Codes Class Noted POA    Chest pain ICD-10-CM: R07.9  ICD-9-CM: 786.50 12/10/2022 Unknown             Review of Systems:   A comprehensive review of systems was negative except for that written in the HPI. Vital Signs:    Last 24hrs VS reviewed since prior progress note. Most recent are:  Visit Vitals  BP (!) 121/46 (BP 1 Location: Right arm, BP Patient Position: Sitting)   Pulse 72   Temp 97.7 °F (36.5 °C)   Resp 18   Wt 113.2 kg (249 lb 9 oz)   SpO2 95%       No intake or output data in the 24 hours ending 12/11/22 1015     Physical Examination:     I had a face to face encounter with this patient and independently examined them on 12/11/2022 as outlined below:          Constitutional:  No acute distress, cooperative, pleasant    ENT:  Oral mucosa moist, oropharynx benign. Resp:  CTA bilaterally. No wheezing/rhonchi/rales. No accessory muscle use. CV:  Regular rhythm, normal rate, no murmurs, gallops, rubs    GI:  Soft, non distended, non tender. normoactive bowel sounds, no hepatosplenomegaly     Musculoskeletal:  No edema, warm, 2+ pulses throughout    Neurologic:  Moves all extremities. AAOx3, CN II-XII reviewed            Data Review:    Review and/or order of clinical lab test      Labs:     Recent Labs     12/10/22  1453   WBC 5.2   HGB 11.3*   HCT 34.0*        Recent Labs     12/11/22  0105 12/10/22  1453    137   K 4.3 4.3    102   CO2 28 27   BUN 28* 29*   CREA 1.65* 1.92*   * 176*   CA 8.5 8.7     Recent Labs     12/10/22  1453   ALT 18   AP 79   TBILI 0.3   TP 8.0   ALB 3.8   GLOB 4.2*     No results for input(s): INR, PTP, APTT, INREXT in the last 72 hours. No results for input(s): FE, TIBC, PSAT, FERR in the last 72 hours. No results found for: FOL, RBCF   No results for input(s): PH, PCO2, PO2 in the last 72 hours. No results for input(s): CPK, CKNDX, TROIQ in the last 72 hours.     No lab exists for component: CPKMB  No results found for: CHOL, CHOLX, CHLST, CHOLV, HDL, HDLP, LDL, LDLC, DLDLP, TGLX, TRIGL, TRIGP, CHHD, [facility]  Lab Results   Component Value Date/Time    Glucose (POC) 116 12/11/2022 06:12 AM    Glucose (POC) 181 (H) 12/10/2022 10:50 PM    Glucose (POC) 109 12/10/2022 06:53 PM     No results found for: COLOR, APPRN, SPGRU, REFSG, SUYAPA, PROTU, GLUCU, KETU, BILU, UROU, MARIAM, LEUKU, GLUKE, EPSU, BACTU, WBCU, RBCU, CASTS, UCRY      Medications Reviewed:     Current Facility-Administered Medications   Medication Dose Route Frequency    aspirin chewable tablet 81 mg  81 mg Oral DAILY    amLODIPine (NORVASC) tablet 5 mg  5 mg Oral DAILY    ezetimibe (ZETIA) tablet 10 mg  10 mg Oral DAILY    tamsulosin (FLOMAX) capsule 0.4 mg  0.4 mg Oral DAILY    sodium chloride (NS) flush 5-40 mL  5-40 mL IntraVENous Q8H    sodium chloride (NS) flush 5-40 mL  5-40 mL IntraVENous PRN    acetaminophen (TYLENOL) tablet 650 mg  650 mg Oral Q6H PRN    Or    acetaminophen (TYLENOL) suppository 650 mg  650 mg Rectal Q6H PRN    polyethylene glycol (MIRALAX) packet 17 g  17 g Oral DAILY PRN    ondansetron (ZOFRAN ODT) tablet 4 mg  4 mg Oral Q8H PRN    Or    ondansetron (ZOFRAN) injection 4 mg  4 mg IntraVENous Q6H PRN    enoxaparin (LOVENOX) injection 40 mg  40 mg SubCUTAneous DAILY    0.9% sodium chloride infusion  125 mL/hr IntraVENous CONTINUOUS    glucose chewable tablet 16 g  4 Tablet Oral PRN    glucagon (GLUCAGEN) injection 1 mg  1 mg IntraMUSCular PRN    dextrose 10 % infusion 0-250 mL  0-250 mL IntraVENous PRN    insulin lispro (HUMALOG) injection   SubCUTAneous AC&HS     ______________________________________________________________________  EXPECTED LENGTH OF STAY: - - -  ACTUAL LENGTH OF STAY:          1                 Jennifer Cox MD

## 2022-12-11 NOTE — PROGRESS NOTES
Lovenox Monitoring  Indication: DVT Prophylaxis  Recent Labs     12/11/22  0105 12/10/22  1453   HGB  --  11.3*   PLT  --  266   CREA 1.65* 1.92*     Current Weight: 113 kg  Est. CrCl = >30 ml/min  Current Dose: 40 mg subcutaneously every 24 hours. Plan: Change to 30 mg subcutaneously every 12 hours per [facility] P&T Committee Protocol with respect to renal function. Pharmacy will continue to monitor patient daily and will make dosage adjustments based upon changing renal function.

## 2022-12-12 ENCOUNTER — APPOINTMENT (OUTPATIENT)
Dept: NON INVASIVE DIAGNOSTICS | Age: 72
DRG: 247 | End: 2022-12-12
Attending: HOSPITALIST
Payer: MEDICARE

## 2022-12-12 ENCOUNTER — APPOINTMENT (OUTPATIENT)
Dept: NUCLEAR MEDICINE | Age: 72
DRG: 247 | End: 2022-12-12
Attending: HOSPITALIST
Payer: MEDICARE

## 2022-12-12 LAB
ANION GAP SERPL CALC-SCNC: 5 MMOL/L (ref 5–15)
ATRIAL RATE: 87 BPM
ATRIAL RATE: 92 BPM
BUN SERPL-MCNC: 28 MG/DL (ref 6–20)
BUN/CREAT SERPL: 20 (ref 12–20)
CALCIUM SERPL-MCNC: 8.2 MG/DL (ref 8.5–10.1)
CALCULATED P AXIS, ECG09: 64 DEGREES
CALCULATED R AXIS, ECG10: 22 DEGREES
CALCULATED R AXIS, ECG10: 27 DEGREES
CALCULATED T AXIS, ECG11: 136 DEGREES
CALCULATED T AXIS, ECG11: 28 DEGREES
CHLORIDE SERPL-SCNC: 108 MMOL/L (ref 97–108)
CO2 SERPL-SCNC: 26 MMOL/L (ref 21–32)
CREAT SERPL-MCNC: 1.39 MG/DL (ref 0.7–1.3)
DIAGNOSIS, 93000: NORMAL
DIAGNOSIS, 93000: NORMAL
GLUCOSE BLD STRIP.AUTO-MCNC: 104 MG/DL (ref 65–117)
GLUCOSE BLD STRIP.AUTO-MCNC: 127 MG/DL (ref 65–117)
GLUCOSE BLD STRIP.AUTO-MCNC: 134 MG/DL (ref 65–117)
GLUCOSE BLD STRIP.AUTO-MCNC: 146 MG/DL (ref 65–117)
GLUCOSE SERPL-MCNC: 111 MG/DL (ref 65–100)
P-R INTERVAL, ECG05: 140 MS
POTASSIUM SERPL-SCNC: 4.4 MMOL/L (ref 3.5–5.1)
Q-T INTERVAL, ECG07: 336 MS
Q-T INTERVAL, ECG07: 370 MS
QRS DURATION, ECG06: 94 MS
QRS DURATION, ECG06: 96 MS
QTC CALCULATION (BEZET), ECG08: 413 MS
QTC CALCULATION (BEZET), ECG08: 440 MS
SERVICE CMNT-IMP: ABNORMAL
SERVICE CMNT-IMP: NORMAL
SODIUM SERPL-SCNC: 139 MMOL/L (ref 136–145)
STRESS BASELINE DIAS BP: 69 MMHG
STRESS BASELINE HR: 82 BPM
STRESS BASELINE ST DEPRESSION: 0 MM
STRESS BASELINE SYS BP: 148 MMHG
STRESS ESTIMATED WORKLOAD: 1 METS
STRESS EXERCISE DUR MIN: 3 MIN
STRESS EXERCISE DUR SEC: 0 SEC
STRESS PEAK DIAS BP: 69 MMHG
STRESS PEAK SYS BP: 149 MMHG
STRESS PERCENT HR ACHIEVED: 70 %
STRESS POST PEAK HR: 102 BPM
STRESS RATE PRESSURE PRODUCT: NORMAL BPM*MMHG
STRESS STAGE 1 BP: NORMAL MMHG
STRESS STAGE 1 HR: 102 BPM
STRESS STAGE 2 BP: NORMAL MMHG
STRESS STAGE 2 HR: 96 BPM
STRESS STAGE RECOVERY 1 BP: NORMAL MMHG
STRESS STAGE RECOVERY 1 HR: 93 BPM
STRESS TARGET HR: 146 BPM
VENTRICULAR RATE, ECG03: 85 BPM
VENTRICULAR RATE, ECG03: 91 BPM

## 2022-12-12 PROCEDURE — 74011250636 HC RX REV CODE- 250/636: Performed by: STUDENT IN AN ORGANIZED HEALTH CARE EDUCATION/TRAINING PROGRAM

## 2022-12-12 PROCEDURE — 74011250636 HC RX REV CODE- 250/636: Performed by: HOSPITALIST

## 2022-12-12 PROCEDURE — 82962 GLUCOSE BLOOD TEST: CPT

## 2022-12-12 PROCEDURE — 74011636637 HC RX REV CODE- 636/637: Performed by: STUDENT IN AN ORGANIZED HEALTH CARE EDUCATION/TRAINING PROGRAM

## 2022-12-12 PROCEDURE — 74011000250 HC RX REV CODE- 250: Performed by: HOSPITALIST

## 2022-12-12 PROCEDURE — A9500 TC99M SESTAMIBI: HCPCS

## 2022-12-12 PROCEDURE — 78452 HT MUSCLE IMAGE SPECT MULT: CPT

## 2022-12-12 PROCEDURE — 65270000046 HC RM TELEMETRY

## 2022-12-12 PROCEDURE — 80048 BASIC METABOLIC PNL TOTAL CA: CPT

## 2022-12-12 PROCEDURE — 74011250637 HC RX REV CODE- 250/637: Performed by: STUDENT IN AN ORGANIZED HEALTH CARE EDUCATION/TRAINING PROGRAM

## 2022-12-12 PROCEDURE — 74011250636 HC RX REV CODE- 250/636: Performed by: NURSE PRACTITIONER

## 2022-12-12 PROCEDURE — 36415 COLL VENOUS BLD VENIPUNCTURE: CPT

## 2022-12-12 PROCEDURE — 74011250636 HC RX REV CODE- 250/636: Performed by: INTERNAL MEDICINE

## 2022-12-12 PROCEDURE — 74011000250 HC RX REV CODE- 250: Performed by: STUDENT IN AN ORGANIZED HEALTH CARE EDUCATION/TRAINING PROGRAM

## 2022-12-12 PROCEDURE — 74011250637 HC RX REV CODE- 250/637: Performed by: NURSE PRACTITIONER

## 2022-12-12 PROCEDURE — 99223 1ST HOSP IP/OBS HIGH 75: CPT | Performed by: INTERNAL MEDICINE

## 2022-12-12 RX ORDER — METOPROLOL SUCCINATE 25 MG/1
25 TABLET, EXTENDED RELEASE ORAL DAILY
Status: DISCONTINUED | OUTPATIENT
Start: 2022-12-12 | End: 2022-12-14 | Stop reason: HOSPADM

## 2022-12-12 RX ORDER — TETRAKIS(2-METHOXYISOBUTYLISOCYANIDE)COPPER(I) TETRAFLUOROBORATE 1 MG/ML
29 INJECTION, POWDER, LYOPHILIZED, FOR SOLUTION INTRAVENOUS
Status: COMPLETED | OUTPATIENT
Start: 2022-12-12 | End: 2022-12-12

## 2022-12-12 RX ORDER — AMINOPHYLLINE 25 MG/ML
125 INJECTION, SOLUTION INTRAVENOUS ONCE
Status: COMPLETED | OUTPATIENT
Start: 2022-12-12 | End: 2022-12-12

## 2022-12-12 RX ORDER — TETRAKIS(2-METHOXYISOBUTYLISOCYANIDE)COPPER(I) TETRAFLUOROBORATE 1 MG/ML
9 INJECTION, POWDER, LYOPHILIZED, FOR SOLUTION INTRAVENOUS
Status: COMPLETED | OUTPATIENT
Start: 2022-12-12 | End: 2022-12-12

## 2022-12-12 RX ORDER — SODIUM CHLORIDE 0.9 % (FLUSH) 0.9 %
20 SYRINGE (ML) INJECTION AS NEEDED
Status: DISCONTINUED | OUTPATIENT
Start: 2022-12-12 | End: 2022-12-13 | Stop reason: HOSPADM

## 2022-12-12 RX ADMIN — ENOXAPARIN SODIUM 30 MG: 100 INJECTION SUBCUTANEOUS at 22:19

## 2022-12-12 RX ADMIN — SODIUM CHLORIDE, PRESERVATIVE FREE 20 ML: 5 INJECTION INTRAVENOUS at 11:30

## 2022-12-12 RX ADMIN — SODIUM CHLORIDE 125 ML/HR: 9 INJECTION, SOLUTION INTRAVENOUS at 22:20

## 2022-12-12 RX ADMIN — AMINOPHYLLINE 125 MG: 25 INJECTION, SOLUTION INTRAVENOUS at 11:34

## 2022-12-12 RX ADMIN — SODIUM CHLORIDE, PRESERVATIVE FREE 10 ML: 5 INJECTION INTRAVENOUS at 15:57

## 2022-12-12 RX ADMIN — SODIUM CHLORIDE, PRESERVATIVE FREE 10 ML: 5 INJECTION INTRAVENOUS at 06:18

## 2022-12-12 RX ADMIN — TETRAKIS(2-METHOXYISOBUTYLISOCYANIDE)COPPER(I) TETRAFLUOROBORATE 9 MILLICURIE: 1 INJECTION, POWDER, LYOPHILIZED, FOR SOLUTION INTRAVENOUS at 09:00

## 2022-12-12 RX ADMIN — ASPIRIN 81 MG: 81 TABLET, CHEWABLE ORAL at 08:23

## 2022-12-12 RX ADMIN — SODIUM CHLORIDE 125 ML/HR: 9 INJECTION, SOLUTION INTRAVENOUS at 00:15

## 2022-12-12 RX ADMIN — AMLODIPINE BESYLATE 5 MG: 5 TABLET ORAL at 08:24

## 2022-12-12 RX ADMIN — EZETIMIBE 10 MG: 10 TABLET ORAL at 08:24

## 2022-12-12 RX ADMIN — METOPROLOL SUCCINATE 25 MG: 25 TABLET, EXTENDED RELEASE ORAL at 16:36

## 2022-12-12 RX ADMIN — SODIUM CHLORIDE 125 ML/HR: 9 INJECTION, SOLUTION INTRAVENOUS at 08:37

## 2022-12-12 RX ADMIN — REGADENOSON 0.4 MG: 0.08 INJECTION, SOLUTION INTRAVENOUS at 11:30

## 2022-12-12 RX ADMIN — TAMSULOSIN HYDROCHLORIDE 0.4 MG: 0.4 CAPSULE ORAL at 08:23

## 2022-12-12 RX ADMIN — SODIUM CHLORIDE, PRESERVATIVE FREE 10 ML: 5 INJECTION INTRAVENOUS at 22:19

## 2022-12-12 RX ADMIN — Medication 2 UNITS: at 16:35

## 2022-12-12 RX ADMIN — TETRAKIS(2-METHOXYISOBUTYLISOCYANIDE)COPPER(I) TETRAFLUOROBORATE 29 MILLICURIE: 1 INJECTION, POWDER, LYOPHILIZED, FOR SOLUTION INTRAVENOUS at 12:00

## 2022-12-12 RX ADMIN — ENOXAPARIN SODIUM 30 MG: 100 INJECTION SUBCUTANEOUS at 08:24

## 2022-12-12 NOTE — PROGRESS NOTES
[address] [facility] Adult  Hospitalist Group                                                                                          Hospitalist Progress Note  Brigido Smalls MD  Answering service: [phone] from in house phone        Date of Service:  2022  NAME:  Sofya Sheehan  :  1950  MRN:  [id number]      Admission Summary:   Sofya Sheehan is a 74 y.o. male with hx of niddm ii, htn, dyslipidemia, bph, obesity who presented to ed with complaints of chest pain. Pt endorses intermittent episodes of chest pain over the last 5 months. Reports pressure-like chest pain occurring bi-weekly with radiation down both his arms. Other associated symptoms have included sob, malaise and fatigue  The patient currently feel well and denies any ongoing fever, chills, chest or abdominal pain, nausea, vomiting, cough, congestion, recent illness, palpitations, or dysuria. Remarkable vitals on ER Presentations: vss  Labs Remarkable for: cr 1.92, flu 176,   ER Images: cxr: no acute process  ER Rx: none       Interval history / Subjective: Follow up Chest pain   No more chest pain, SOB, dizziness  Whenever has \"the spell\" has SOB, dizziness with pain in shoulders and arms  Usually \"the spells\" happen when he is working or doing something  Assessment & Plan:     Unstable Angina  -c/w serial trops  -lexiscan in am if available  -c/w daily asa  -check uds, tsh, lipid panel, a1c      IDDM II: SSI +Hypoglycemic protocols  MARISEL vs CKD: mivf. hold losartan  BPH: home flomax  Dyslipidemia: c/w home zetia  Obesity:Counseled on weight loss, dieting and exercise         NPO  -cardiac diet after stress test       Code status: FULL CODE  Prophylaxis: Lovenox  PTA: home  Baseline:  Independent    Plan: Follow Cardiology, stress test, likely discharge if unremarkable  Care Plan discussed with: patient  Anticipated Disposition: home     Hospital Problems  Date Reviewed: 2022            Codes Class Noted POA    * (Principal) Chest pain ICD-10-CM: R07.9  ICD-9-CM: 786.50  12/10/2022 Yes           Review of Systems:   A comprehensive review of systems was negative except for that written in the HPI. Vital Signs:    Last 24hrs VS reviewed since prior progress note. Most recent are:  Visit Vitals  BP (!) 155/57 (BP 1 Location: Right upper arm, BP Patient Position: At rest)   Pulse 84   Temp 97.6 °F (36.4 °C)   Resp 18   Wt 112.4 kg (247 lb 12.8 oz)   SpO2 96%         Intake/Output Summary (Last 24 hours) at 12/12/2022 1022  Last data filed at 12/12/2022 9850  Gross per 24 hour   Intake 400 ml   Output 0 ml   Net 400 ml          Physical Examination:     I had a face to face encounter with this patient and independently examined them on 12/12/2022 as outlined below:          Constitutional:  No acute distress, cooperative, pleasant    ENT:  Oral mucosa moist, oropharynx benign. Resp:  CTA bilaterally. No wheezing/rhonchi/rales. No accessory muscle use. CV:  Regular rhythm, normal rate, no murmurs, gallops, rubs    GI:  Soft, non distended, non tender. normoactive bowel sounds, no hepatosplenomegaly     Musculoskeletal:  No edema, warm, 2+ pulses throughout    Neurologic:  Moves all extremities. AAOx3, CN II-XII reviewed            Data Review:    Review and/or order of clinical lab test      Labs:     Recent Labs     12/10/22  1453   WBC 5.2   HGB 11.3*   HCT 34.0*          Recent Labs     12/12/22  0014 12/11/22  0105 12/10/22  1453    136 137   K 4.4 4.3 4.3    106 102   CO2 26 28 27   BUN 28* 28* 29*   CREA 1.39* 1.65* 1.92*   * 182* 176*   CA 8.2* 8.5 8.7       Recent Labs     12/10/22  1453   ALT 18   AP 79   TBILI 0.3   TP 8.0   ALB 3.8   GLOB 4.2*       No results for input(s): INR, PTP, APTT, INREXT, INREXT in the last 72 hours. No results for input(s): FE, TIBC, PSAT, FERR in the last 72 hours.    No results found for: FOL, RBCF   No results for input(s): PH, PCO2, PO2 in the last 72 hours. No results for input(s): CPK, CKNDX, TROIQ in the last 72 hours.     No lab exists for component: CPKMB  No results found for: CHOL, CHOLX, CHLST, CHOLV, HDL, HDLP, LDL, LDLC, DLDLP, TGLX, TRIGL, TRIGP, CHHD, CHHDX  Lab Results   Component Value Date/Time    Glucose (POC) 104 12/12/2022 06:24 AM    Glucose (POC) 132 (H) 12/11/2022 09:06 PM    Glucose (POC) 107 12/11/2022 04:25 PM    Glucose (POC) 105 12/11/2022 11:21 AM    Glucose (POC) 116 12/11/2022 06:12 AM     No results found for: COLOR, APPRN, SPGRU, REFSG, SUYAPA, PROTU, GLUCU, KETU, BILU, UROU, MARIAM, LEUKU, GLUKE, EPSU, BACTU, WBCU, RBCU, CASTS, UCRY      Medications Reviewed:     Current Facility-Administered Medications   Medication Dose Route Frequency    regadenoson (LEXISCAN) injection 0.4 mg  0.4 mg IntraVENous RAD ONCE    saline peripheral flush soln 20 mL  20 mL InterCATHeter PRN    enoxaparin (LOVENOX) injection 30 mg  30 mg SubCUTAneous Q12H    aspirin chewable tablet 81 mg  81 mg Oral DAILY    amLODIPine (NORVASC) tablet 5 mg  5 mg Oral DAILY    ezetimibe (ZETIA) tablet 10 mg  10 mg Oral DAILY    tamsulosin (FLOMAX) capsule 0.4 mg  0.4 mg Oral DAILY    sodium chloride (NS) flush 5-40 mL  5-40 mL IntraVENous Q8H    sodium chloride (NS) flush 5-40 mL  5-40 mL IntraVENous PRN    acetaminophen (TYLENOL) tablet 650 mg  650 mg Oral Q6H PRN    Or    acetaminophen (TYLENOL) suppository 650 mg  650 mg Rectal Q6H PRN    polyethylene glycol (MIRALAX) packet 17 g  17 g Oral DAILY PRN    ondansetron (ZOFRAN ODT) tablet 4 mg  4 mg Oral Q8H PRN    Or    ondansetron (ZOFRAN) injection 4 mg  4 mg IntraVENous Q6H PRN    0.9% sodium chloride infusion  125 mL/hr IntraVENous CONTINUOUS    glucose chewable tablet 16 g  4 Tablet Oral PRN    glucagon (GLUCAGEN) injection 1 mg  1 mg IntraMUSCular PRN    dextrose 10 % infusion 0-250 mL  0-250 mL IntraVENous PRN    insulin lispro (HUMALOG) injection   SubCUTAneous AC&HS ______________________________________________________________________  EXPECTED LENGTH OF STAY: 1d 21h  ACTUAL LENGTH OF STAY:          2                 Yue Kay MD

## 2022-12-12 NOTE — PROGRESS NOTES
0730: Bedside and Verbal shift change report given to Juany Milton RN (oncoming nurse) by Dominique Escobedo RN (offgoing nurse). Report included the following information SBAR, Kardex, Intake/Output, MAR, Recent Results, and Cardiac Rhythm Sinus Rhythm . 1000: Per MD sindhu patient may travel without nurse and monitor and patient can shower. 1050: Patient off floor without nurse and monitor for stress test    1253: Patient returned to unit and placed back on monitor. 1328: Per Carina Tejada MD okay to place diet orders. 1930: Bedside and Verbal shift change report given to Louisa Duke RN (oncoming nurse) by Juany Milton RN (offgoing nurse). Report included the following information SBAR, Kardex, Intake/Output, MAR, Recent Results, and Cardiac Rhythm Sinus Rhythm . Care Plans:   Problem: Falls - Risk of  Goal: *Absence of Falls  Description: Document Columba Fall Risk and appropriate interventions in the flowsheet.   Outcome: Progressing Towards Goal  Note: Fall Risk Interventions:                                Problem: Patient Education: Go to Patient Education Activity  Goal: Patient/Family Education  Outcome: Progressing Towards Goal     Problem: General Medical Care Plan  Goal: *Vital signs within specified parameters  Outcome: Progressing Towards Goal  Goal: *Labs within defined limits  Outcome: Progressing Towards Goal  Goal: *Absence of infection signs and symptoms  Outcome: Progressing Towards Goal  Goal: *Optimal pain control at patient's stated goal  Outcome: Progressing Towards Goal  Goal: *Skin integrity maintained  Outcome: Progressing Towards Goal  Goal: *Fluid volume balance  Outcome: Progressing Towards Goal  Goal: *Optimize nutritional status  Outcome: Progressing Towards Goal  Goal: *Anxiety reduced or absent  Outcome: Progressing Towards Goal  Goal: *Progressive mobility and function (eg: ADL's)  Outcome: Progressing Towards Goal     Problem: Patient Education: Go to Patient Education Activity  Goal: Patient/Family Education  Outcome: Progressing Towards Goal

## 2022-12-12 NOTE — CONSULTS
[facility]                       Cardiology Care Note     [x]Initial Consult     []Progress note    Patient Name: Aníbal Apodaca - CHG:3[birth year]9 - [facility]:[id number]  Primary Cardiologist: [facility] Cardiology Physicians: none  Consulting Cardiologist: [facility] Cardiology Physicians: Khadijah Vela MD     Reason for consult: chest pain    HPI:  Aníbal Apodaca is a 74 y.o. male with PMH significant for HTN, DM, HLD, and obesity. He presented to ED with c/o chest pain. He reports that since August he has had about 4 episodes of chest pain, both with and without exertion. He reports associated SOB and pain that radiates down his loli arms. This past Thursday he had an echo done at [facility] hospital outpt after seeing PCP with c/o chest pain. Friday he had an episode of CP at rest, radiated down his arms and he was very dizzy. Saturday he didn't feel well and his SBP was around 200. He decided he should go to ED for further evaluation. His father had diabetes and passed from complications related to DM around age 79. No family hx of CAD. He reports he is able to do things with exertion and not have symptoms. Has not been able to identify the trigger. SUBJECTIVE:  Aníbal Apodaca currently denies CP, SOB, orthopnea, PND or palpitations. Assessment and Plan     Chest pain: associated radiation down loli arms, sob and dizziness. Not always triggered by exertion. On arrival to ED EKG SR with ST/T wave changes lateral/inferior leads. No prior EKG to compare with. Troponin 21,25. Currently asymptomatic. Pt to have nuclear stress test today. Further recommendations dependent on results. Cont Zetia and ASA. HTN: mostly normotensive since admission. Continue Amlodipine. Could consider Metoprolol dependent on results. PTA meds Amlodipine, Ramipril and HCTZ  3. HLD: on Zetia. No statin noted  4. DM: at home on Metformin, Glipizide.  Management per primary team. Saw and evaluated pt and agree with above assessment and plan. Pt presentation concerning for underlying ischemia. Compensated on exam. Plan for cath tomorrow as stress test was abnormal.  Brooke Blackman MD         ____________________________________________________________    Cardiac testing              Most recent HS troponins:  Recent Labs     12/10/22  2258 12/10/22  1453   TROPHS 25 21           Review of Systems:    [x]All other systems reviewed and all negative except as written in HPI    [] Patient unable to provide secondary to condition    Past Medical History:   Diagnosis Date    Diabetes ([address]     Hepatitis 1959    not active    Hypertension     Kidney stones     Malaria 1969    while serving in [employer]     Past Surgical History:   Procedure Laterality Date    HX KNEE REPLACEMENT Right 03/05/2019    Dr. Tori Bryan Hx:  reports that he has never smoked. He has never used smokeless tobacco. He reports that he does not drink alcohol and does not use drugs. Family Hx: family history is not on file. No Known Allergies       OBJECTIVE:  Wt Readings from Last 3 Encounters:   12/12/22 112.4 kg (247 lb 12.8 oz)       Intake/Output Summary (Last 24 hours) at 12/12/2022 1035  Last data filed at 12/12/2022 0819  Gross per 24 hour   Intake 400 ml   Output 0 ml   Net 400 ml       Physical Exam:    Vitals:   Vitals:    12/12/22 0600 12/12/22 0627 12/12/22 0702 12/12/22 0819   BP:   (!) 155/57    Pulse: 67  78 84   Resp:   18    Temp:   97.6 °F (36.4 °C)    SpO2:   96%    Weight:  112.4 kg (247 lb 12.8 oz)       Telemetry: normal sinus rhythm    Gen: Well-developed, well-nourished, in no acute distress  Neck: Supple, No JVD, No Carotid Bruit  Resp: No accessory muscle use, Clear breath sounds, No rales or rhonchi  Card: Regular Rate,Rythm, Normal S1, S2, + murmurs, no rubs or gallop. No thrills.    Abd:   Soft, non-tender, non-distended, BS+   MSK: No cyanosis  Skin: No rashes    Neuro: Moving all four extremities, follows commands appropriately  Psych: Good insight, oriented to person, place, alert, Nml Affect  LE: No edema    Data Review:     Radiology:   XR Results (most recent):  Results from Hospital Encounter encounter on 12/10/22    XR CHEST PORT    Narrative  INDICATION: cp    EXAM:  AP CHEST RADIOGRAPH    COMPARISON: None    FINDINGS:    AP portable view of the chest demonstrates a normal cardiomediastinal  silhouette. There is no edema, effusion, consolidation, or pneumothorax. The  osseous structures are unremarkable. Impression  No acute process. Recent Labs     12/12/22  0014 12/11/22  0105    136   K 4.4 4.3    106   CO2 26 28   BUN 28* 28*   CREA 1.39* 1.65*   * 182*   CA 8.2* 8.5     Recent Labs     12/10/22  1453   WBC 5.2   HGB 11.3*   HCT 34.0*        Recent Labs     12/10/22  1453   AP 79     No results for input(s): CHOL, LDLC in the last 72 hours.     No lab exists for component: TGL, HDLC,  HBA1C      Current meds:    Current Facility-Administered Medications:     regadenoson (LEXISCAN) injection 0.4 mg, 0.4 mg, IntraVENous, RAD ONCE, Channing Monroy MD    saline peripheral flush soln 20 mL, 20 mL, InterCATHeter, PRN, Channing Breen MD    enoxaparin (LOVENOX) injection 30 mg, 30 mg, SubCUTAneous, Q12H, Channing Monroy MD, 30 mg at 12/12/22 3496    aspirin chewable tablet 81 mg, 81 mg, Oral, DAILY, David Diaz MD, 81 mg at 12/12/22 0823    amLODIPine (NORVASC) tablet 5 mg, 5 mg, Oral, DAILY, David Diaz MD, 5 mg at 12/12/22 0824    ezetimibe (ZETIA) tablet 10 mg, 10 mg, Oral, DAILY, David Diaz MD, 10 mg at 12/12/22 0824    tamsulosin (FLOMAX) capsule 0.4 mg, 0.4 mg, Oral, DAILY, David Diaz MD, 0.4 mg at 12/12/22 0823    sodium chloride (NS) flush 5-40 mL, 5-40 mL, IntraVENous, Q8H, David Diaz MD, 10 mL at 12/12/22 0618    sodium chloride (NS) flush 5-40 mL, 5-40 mL, IntraVENous, PRN, David Diaz MD    acetaminophen (TYLENOL) tablet 650 mg, 650 mg, Oral, Q6H PRN **OR** acetaminophen (TYLENOL) suppository 650 mg, 650 mg, Rectal, Q6H PRN, David Diaz MD    polyethylene glycol (MIRALAX) packet 17 g, 17 g, Oral, DAILY PRN, David Diaz MD    ondansetron (ZOFRAN ODT) tablet 4 mg, 4 mg, Oral, Q8H PRN **OR** ondansetron (ZOFRAN) injection 4 mg, 4 mg, IntraVENous, Q6H PRN, David Diaz MD    0.9% sodium chloride infusion, 125 mL/hr, IntraVENous, CONTINUOUS, David Daiz MD, Last Rate: 125 mL/hr at 12/12/22 0837, 125 mL/hr at 12/12/22 0837    glucose chewable tablet 16 g, 4 Tablet, Oral, PRN, David Diaz MD    glucagon (GLUCAGEN) injection 1 mg, 1 mg, IntraMUSCular, PRN, David Diaz MD    dextrose 10 % infusion 0-250 mL, 0-250 mL, IntraVENous, PRN, David Diaz MD    insulin lispro (HUMALOG) injection, , SubCUTAneous, AC&HS, David Diaz MD Leobardo Hummingbird, NP    [employer] [facility]  Call center: [phone]      Kinga Pickens MD

## 2022-12-12 NOTE — PROGRESS NOTES
Physician Progress Note      PATIENT:               Arden Espinal  CSN #:                  [id number]  :                       1950  ADMIT DATE:       12/10/2022 2:43 PM  [address] DATE:  RESPONDING  PROVIDER #:        Marco Hughes MD          QUERY TEXT:    Pt admitted with chest pain. Pt noted to have MARISEL vs CKD with renal function labs improved after fluids. If possible, please document in the progress notes and discharge summary if you are evaluating and/or treating any of the following: The medical record reflects the following:  Risk Factors: abnormal renal function labs  Clinical Indicators:  12/10/22 14:53  BUN: 29 (H)  Creatinine: 1.92 (H)  eGFR: 36 (L)    22 01:05  BUN: 28 (H)  Creatinine: 1.65 (H)  eGFR: 43 (L)    22 00:14  BUN: 28 (H)  Creatinine: 1.39 (H)  eGFR: 53 (L)    Treatment: renal function labs monitoring, IV fluids NS @ 125 cc/hr    Thank you,  Jena Larson RN, CDI, CRCR, CCDS  Certified Clinical Documentation   [phone]  You can also contact me via [facility]. Defined by Kidney Disease Improving Global Outcomes (KDIGO) clinical practice guideline for acute kidney injury:  -Increase in SCr by greater than or equal to 0.3 mg/dl within 48?hours; or  -Increase or decrease in SCr to greater than or equal to 1.5 times baseline, which is known or presumed to have occurred within the prior 7 days; or  -Urine volume < 0.5ml/kg/h for 6 hours  Options provided:  -- Acute kidney injury with CKD ruled out  -- Acute kidney failure with CKD ruled out  -- Other - I will add my own diagnosis  -- Disagree - Not applicable / Not valid  -- Disagree - Clinically unable to determine / Unknown  -- Refer to Clinical Documentation Reviewer    PROVIDER RESPONSE TEXT:    This patient has an Acute kidney injury with CKD ruled out.     Query created by: Margie Quintana on 2022 8:37 AM      Electronically signed by:  Marco Hughes MD 2022 10:22 AM

## 2022-12-12 NOTE — PROGRESS NOTES
1930: Bedside and Verbal shift change report given to JEMIMA Fisher RN (oncoming nurse) by Roxy Navas (offgoing nurse). Report included the following information SBAR, ED Summary, Med Rec Status, and Cardiac Rhythm NSR .        Problem: General Medical Care Plan  Goal: *Vital signs within specified parameters  Outcome: Progressing Towards Goal  Goal: *Skin integrity maintained  Outcome: Progressing Towards Goal  Goal: *Anxiety reduced or absent  Outcome: Progressing Towards Goal

## 2022-12-12 NOTE — PROGRESS NOTES
Stress test results showed:  1. Findings are suspicious for inferior wall infarction without definite evidence of ischemia. 2. LVEF equals 46%. There is hypokinesis and diminished wall thickening in the inferior wall    He is a candidate for a cardiac cath based on these results and his symptoms. I discussed the risks and benefits of left cardiac catheterization +/- PCI with the patient and family who expressed understanding and wishes to proceed. I will add Metoprolol XL 25mg daily, cont ASA. NPO after MN and hold Lovenox tomorrow am. He was previously on Simvastatin, had myalgias on this so was taken off. No other statin tried. Will revisit this based on cath results.

## 2022-12-12 NOTE — PROGRESS NOTES
Transitions of Care Plan  RUR: 5% - low  Clinical Update: stress test today; STEMI  Consults: Cardiology  Baseline: independent without DME; resides with spouse  Barriers to Discharge: medical  Disposition: home with family   Estimated Discharge Date: 12/12/22    Reason for Admission:  STEMI                   RUR Score:          5% - low           Plan for utilizing home health:      No    PCP: First and Last name:  Julissa Gutierrez MD     Name of Practice:    Are you a current patient: Yes/No:    Approximate date of last visit:    Can you participate in a virtual visit with your PCP:                     Current Advanced Directive/Advance Care Plan: Full Code      Healthcare Decision Maker:   Click here to complete Blood cell Storage including selection of the [address] (ie \"Primary\")           Wife - Leobardo Arredondo - p: [phone]                  Transition of Care Plan:                      Patient remains at his independent baseline function. Plans for stress test today and discharge is pending results of stress test. No CM needs identified. Gene Sidhu, MPH  Care Manager [facility]  Available via [address] [place] [address] or  [facility]    Care Management Interventions  PCP Verified by CM: Yes  Palliative Care Criteria Met (RRAT>21 & CHF Dx)?: No  Mode of Transport at Discharge:  Other (see comment)  Transition of Care Consult (CM Consult): Discharge Planning  MyChart Signup: No  Discharge Durable Medical Equipment: No  Health Maintenance Reviewed: Yes  Physical Therapy Consult: No  Occupational Therapy Consult: No  Speech Therapy Consult: No  Support Systems: Spouse/Significant Other  Confirm Follow Up Transport: Family  Discharge Location  Patient Expects to be Discharged to[de-identified] Home

## 2022-12-13 ENCOUNTER — APPOINTMENT (OUTPATIENT)
Dept: NON INVASIVE DIAGNOSTICS | Age: 72
DRG: 247 | End: 2022-12-13
Attending: INTERNAL MEDICINE
Payer: MEDICARE

## 2022-12-13 ENCOUNTER — TRANSCRIBE ORDER (OUTPATIENT)
Dept: CARDIAC REHAB | Age: 72
End: 2022-12-13

## 2022-12-13 DIAGNOSIS — Z95.5 STENTED CORONARY ARTERY: Primary | ICD-10-CM

## 2022-12-13 LAB
ACT BLD: 203 SECS (ref 79–138)
ACT BLD: 444 SECS (ref 79–138)
ATRIAL RATE: 66 BPM
CALCULATED P AXIS, ECG09: 46 DEGREES
CALCULATED R AXIS, ECG10: 23 DEGREES
CALCULATED T AXIS, ECG11: 63 DEGREES
DIAGNOSIS, 93000: NORMAL
GLUCOSE BLD STRIP.AUTO-MCNC: 110 MG/DL (ref 65–117)
GLUCOSE BLD STRIP.AUTO-MCNC: 118 MG/DL (ref 65–117)
GLUCOSE BLD STRIP.AUTO-MCNC: 150 MG/DL (ref 65–117)
P-R INTERVAL, ECG05: 158 MS
Q-T INTERVAL, ECG07: 428 MS
QRS DURATION, ECG06: 96 MS
QTC CALCULATION (BEZET), ECG08: 448 MS
SERVICE CMNT-IMP: ABNORMAL
SERVICE CMNT-IMP: ABNORMAL
SERVICE CMNT-IMP: NORMAL
VENTRICULAR RATE, ECG03: 66 BPM

## 2022-12-13 PROCEDURE — 92928 PRQ TCAT PLMT NTRAC ST 1 LES: CPT | Performed by: INTERNAL MEDICINE

## 2022-12-13 PROCEDURE — 77030040934 HC CATH DIAG DXTERITY MEDT -A: Performed by: INTERNAL MEDICINE

## 2022-12-13 PROCEDURE — C1769 GUIDE WIRE: HCPCS | Performed by: INTERNAL MEDICINE

## 2022-12-13 PROCEDURE — 74011000636 HC RX REV CODE- 636: Performed by: INTERNAL MEDICINE

## 2022-12-13 PROCEDURE — 36245 INS CATH ABD/L-EXT ART 1ST: CPT | Performed by: INTERNAL MEDICINE

## 2022-12-13 PROCEDURE — 74011250637 HC RX REV CODE- 250/637: Performed by: NURSE PRACTITIONER

## 2022-12-13 PROCEDURE — 74011000250 HC RX REV CODE- 250: Performed by: STUDENT IN AN ORGANIZED HEALTH CARE EDUCATION/TRAINING PROGRAM

## 2022-12-13 PROCEDURE — C1874 STENT, COATED/COV W/DEL SYS: HCPCS | Performed by: INTERNAL MEDICINE

## 2022-12-13 PROCEDURE — 74011000250 HC RX REV CODE- 250: Performed by: INTERNAL MEDICINE

## 2022-12-13 PROCEDURE — 99233 SBSQ HOSP IP/OBS HIGH 50: CPT | Performed by: INTERNAL MEDICINE

## 2022-12-13 PROCEDURE — 93005 ELECTROCARDIOGRAM TRACING: CPT

## 2022-12-13 PROCEDURE — C1887 CATHETER, GUIDING: HCPCS | Performed by: INTERNAL MEDICINE

## 2022-12-13 PROCEDURE — 99152 MOD SED SAME PHYS/QHP 5/>YRS: CPT | Performed by: INTERNAL MEDICINE

## 2022-12-13 PROCEDURE — 99153 MOD SED SAME PHYS/QHP EA: CPT | Performed by: INTERNAL MEDICINE

## 2022-12-13 PROCEDURE — 77030013744: Performed by: INTERNAL MEDICINE

## 2022-12-13 PROCEDURE — B2151ZZ FLUOROSCOPY OF LEFT HEART USING LOW OSMOLAR CONTRAST: ICD-10-PCS | Performed by: INTERNAL MEDICINE

## 2022-12-13 PROCEDURE — 77030013715 HC INFL SYS MRTM -B: Performed by: INTERNAL MEDICINE

## 2022-12-13 PROCEDURE — 93458 L HRT ARTERY/VENTRICLE ANGIO: CPT | Performed by: INTERNAL MEDICINE

## 2022-12-13 PROCEDURE — 027135Z DILATION OF CORONARY ARTERY, TWO ARTERIES WITH TWO DRUG-ELUTING INTRALUMINAL DEVICES, PERCUTANEOUS APPROACH: ICD-10-PCS | Performed by: INTERNAL MEDICINE

## 2022-12-13 PROCEDURE — 93306 TTE W/DOPPLER COMPLETE: CPT

## 2022-12-13 PROCEDURE — 77030019569 HC BND COMPR RAD TERU -B: Performed by: INTERNAL MEDICINE

## 2022-12-13 PROCEDURE — 74011250636 HC RX REV CODE- 250/636: Performed by: STUDENT IN AN ORGANIZED HEALTH CARE EDUCATION/TRAINING PROGRAM

## 2022-12-13 PROCEDURE — 92929 PR PRQ TRLUML CORONARY STENT W/ANGIO ADDL ART/BRNCH: CPT | Performed by: INTERNAL MEDICINE

## 2022-12-13 PROCEDURE — 74011250636 HC RX REV CODE- 250/636: Performed by: INTERNAL MEDICINE

## 2022-12-13 PROCEDURE — 85347 COAGULATION TIME ACTIVATED: CPT

## 2022-12-13 PROCEDURE — 82962 GLUCOSE BLOOD TEST: CPT

## 2022-12-13 PROCEDURE — 65270000046 HC RM TELEMETRY

## 2022-12-13 PROCEDURE — 74011250637 HC RX REV CODE- 250/637: Performed by: STUDENT IN AN ORGANIZED HEALTH CARE EDUCATION/TRAINING PROGRAM

## 2022-12-13 PROCEDURE — C1894 INTRO/SHEATH, NON-LASER: HCPCS | Performed by: INTERNAL MEDICINE

## 2022-12-13 PROCEDURE — 4A023N7 MEASUREMENT OF CARDIAC SAMPLING AND PRESSURE, LEFT HEART, PERCUTANEOUS APPROACH: ICD-10-PCS | Performed by: INTERNAL MEDICINE

## 2022-12-13 PROCEDURE — 74011250637 HC RX REV CODE- 250/637: Performed by: INTERNAL MEDICINE

## 2022-12-13 PROCEDURE — B2111ZZ FLUOROSCOPY OF MULTIPLE CORONARY ARTERIES USING LOW OSMOLAR CONTRAST: ICD-10-PCS | Performed by: INTERNAL MEDICINE

## 2022-12-13 PROCEDURE — C1725 CATH, TRANSLUMIN NON-LASER: HCPCS | Performed by: INTERNAL MEDICINE

## 2022-12-13 DEVICE — STENT RONYX30015UX RESOLUTE ONYX 3.00X15
Type: IMPLANTABLE DEVICE | Status: FUNCTIONAL
Brand: RESOLUTE ONYX™

## 2022-12-13 DEVICE — STENT RONYX35018UX RESOLUTE ONYX 3.50X18
Type: IMPLANTABLE DEVICE | Status: FUNCTIONAL
Brand: RESOLUTE ONYX™

## 2022-12-13 RX ORDER — HEPARIN SODIUM 1000 [USP'U]/ML
INJECTION, SOLUTION INTRAVENOUS; SUBCUTANEOUS AS NEEDED
Status: DISCONTINUED | OUTPATIENT
Start: 2022-12-13 | End: 2022-12-13 | Stop reason: HOSPADM

## 2022-12-13 RX ORDER — CLOPIDOGREL 300 MG/1
TABLET, FILM COATED ORAL AS NEEDED
Status: DISCONTINUED | OUTPATIENT
Start: 2022-12-13 | End: 2022-12-13 | Stop reason: HOSPADM

## 2022-12-13 RX ORDER — CLOPIDOGREL BISULFATE 75 MG/1
75 TABLET ORAL DAILY
Status: DISCONTINUED | OUTPATIENT
Start: 2022-12-14 | End: 2022-12-14 | Stop reason: HOSPADM

## 2022-12-13 RX ORDER — FENTANYL CITRATE 50 UG/ML
INJECTION, SOLUTION INTRAMUSCULAR; INTRAVENOUS AS NEEDED
Status: DISCONTINUED | OUTPATIENT
Start: 2022-12-13 | End: 2022-12-13 | Stop reason: HOSPADM

## 2022-12-13 RX ORDER — MIDAZOLAM HYDROCHLORIDE 1 MG/ML
INJECTION, SOLUTION INTRAMUSCULAR; INTRAVENOUS AS NEEDED
Status: DISCONTINUED | OUTPATIENT
Start: 2022-12-13 | End: 2022-12-13 | Stop reason: HOSPADM

## 2022-12-13 RX ORDER — LIDOCAINE HYDROCHLORIDE 10 MG/ML
INJECTION INFILTRATION; PERINEURAL AS NEEDED
Status: DISCONTINUED | OUTPATIENT
Start: 2022-12-13 | End: 2022-12-13 | Stop reason: HOSPADM

## 2022-12-13 RX ADMIN — METOPROLOL SUCCINATE 25 MG: 25 TABLET, EXTENDED RELEASE ORAL at 08:53

## 2022-12-13 RX ADMIN — EZETIMIBE 10 MG: 10 TABLET ORAL at 08:54

## 2022-12-13 RX ADMIN — SODIUM CHLORIDE, PRESERVATIVE FREE 10 ML: 5 INJECTION INTRAVENOUS at 22:22

## 2022-12-13 RX ADMIN — SODIUM CHLORIDE 125 ML/HR: 9 INJECTION, SOLUTION INTRAVENOUS at 19:16

## 2022-12-13 RX ADMIN — SODIUM CHLORIDE, PRESERVATIVE FREE 10 ML: 5 INJECTION INTRAVENOUS at 05:31

## 2022-12-13 RX ADMIN — AMLODIPINE BESYLATE 5 MG: 5 TABLET ORAL at 08:54

## 2022-12-13 RX ADMIN — TAMSULOSIN HYDROCHLORIDE 0.4 MG: 0.4 CAPSULE ORAL at 08:54

## 2022-12-13 RX ADMIN — SODIUM CHLORIDE 125 ML/HR: 9 INJECTION, SOLUTION INTRAVENOUS at 06:37

## 2022-12-13 RX ADMIN — ASPIRIN 81 MG: 81 TABLET, CHEWABLE ORAL at 08:53

## 2022-12-13 NOTE — PROGRESS NOTES
[facility] Adult  Hospitalist Group                                                                                          Hospitalist Progress Note  Yasmine Ritchie MD  Answering service: [phone] OR [phone] from in house phone        Date of Service:  2022  NAME:  Alisia Brittle  :  1950  MRN:  [id number]      Admission Summary:   Alisia Brittle is a 74 y.o. male with hx of niddm ii, htn, dyslipidemia, bph, obesity who presented to ed with complaints of chest pain. Pt endorses intermittent episodes of chest pain over the last 5 months. Reports pressure-like chest pain occurring bi-weekly with radiation down both his arms. Other associated symptoms have included sob, malaise and fatigue  The patient currently feel well and denies any ongoing fever, chills, chest or abdominal pain, nausea, vomiting, cough, congestion, recent illness, palpitations, or dysuria. Remarkable vitals on ER Presentations: vss  Labs Remarkable for: cr 1.92, flu 176,   ER Images: cxr: no acute process  ER Rx: none       Interval history / Subjective: Follow up Chest pain   No more chest pain, SOB, dizziness  Feels better  Wife in the room  Assessment & Plan:     Unstable Angina  -c/w serial trops  -abnormal stress test  -Going for cath today      IDDM II: SSI +Hypoglycemic protocols  MARISEL vs CKD: mivf. hold losartan  BPH: home flomax  Dyslipidemia: c/w home zetia  Obesity:Counseled on weight loss, dieting and exercise         NPO  -cath today  -Diabetic diet after that       Code status: FULL CODE  Prophylaxis: Lovenox  PTA: home  Baseline: Independent    Plan: Cath today  Care Plan discussed with: patient  Anticipated Disposition: home     Hospital Problems  Date Reviewed: 2022            Codes Class Noted POA    * (Principal) Chest pain ICD-10-CM: R07.9  ICD-9-CM: 786.50  12/10/2022 Yes         Review of Systems:   A comprehensive review of systems was negative except for that written in the HPI. Vital Signs:    Last 24hrs VS reviewed since prior progress note. Most recent are:  Visit Vitals  BP (!) 146/46 (BP 1 Location: Right upper arm, BP Patient Position: Sitting)   Pulse 71   Temp 98.1 °F (36.7 °C)   Resp 20   Ht 6' (1.829 m)   Wt 112.4 kg (247 lb 12.8 oz)   SpO2 98%   BMI 33.61 kg/m²         Intake/Output Summary (Last 24 hours) at 12/13/2022 1129  Last data filed at 12/13/2022 1120  Gross per 24 hour   Intake 3067.5 ml   Output 0 ml   Net 3067.5 ml          Physical Examination:     I had a face to face encounter with this patient and independently examined them on 12/13/2022 as outlined below:          Constitutional:  No acute distress, cooperative, pleasant    ENT:  Oral mucosa moist, oropharynx benign. Resp:  CTA bilaterally. No wheezing/rhonchi/rales. No accessory muscle use. CV:  Regular rhythm, normal rate, no murmurs, gallops, rubs    GI:  Soft, non distended, non tender. normoactive bowel sounds, no hepatosplenomegaly     Musculoskeletal:  No edema, warm, 2+ pulses throughout    Neurologic:  Moves all extremities. AAOx3, CN II-XII reviewed            Data Review:    Review and/or order of clinical lab test      Labs:     Recent Labs     12/10/22  1453   WBC 5.2   HGB 11.3*   HCT 34.0*          Recent Labs     12/12/22  0014 12/11/22  0105 12/10/22  1453    136 137   K 4.4 4.3 4.3    106 102   CO2 26 28 27   BUN 28* 28* 29*   CREA 1.39* 1.65* 1.92*   * 182* 176*   CA 8.2* 8.5 8.7       Recent Labs     12/10/22  1453   ALT 18   AP 79   TBILI 0.3   TP 8.0   ALB 3.8   GLOB 4.2*       No results for input(s): INR, PTP, APTT, INREXT, INREXT in the last 72 hours. No results for input(s): FE, TIBC, PSAT, FERR in the last 72 hours. No results found for: FOL, RBCF   No results for input(s): PH, PCO2, PO2 in the last 72 hours. No results for input(s): CPK, CKNDX, TROIQ in the last 72 hours.     No lab exists for component: CPKMB  No results found for: CHOL, CHOLX, CHLST, CHOLV, HDL, HDLP, LDL, LDLC, DLDLP, TGLX, TRIGL, TRIGP, CHHD, CHHDX  Lab Results   Component Value Date/Time    Glucose (POC) 118 (H) 12/13/2022 11:17 AM    Glucose (POC) 134 (H) 12/12/2022 09:10 PM    Glucose (POC) 146 (H) 12/12/2022 04:15 PM    Glucose (POC) 127 (H) 12/12/2022 01:33 PM    Glucose (POC) 104 12/12/2022 06:24 AM     No results found for: COLOR, APPRN, SPGRU, REFSG, SUYAPA, PROTU, GLUCU, KETU, BILU, UROU, MARIAM, LEUKU, GLUKE, EPSU, BACTU, WBCU, RBCU, CASTS, UCRY      Medications Reviewed:     Current Facility-Administered Medications   Medication Dose Route Frequency    saline peripheral flush soln 20 mL  20 mL InterCATHeter PRN    metoprolol succinate (TOPROL-XL) XL tablet 25 mg  25 mg Oral DAILY    aspirin chewable tablet 81 mg  81 mg Oral DAILY    amLODIPine (NORVASC) tablet 5 mg  5 mg Oral DAILY    ezetimibe (ZETIA) tablet 10 mg  10 mg Oral DAILY    tamsulosin (FLOMAX) capsule 0.4 mg  0.4 mg Oral DAILY    sodium chloride (NS) flush 5-40 mL  5-40 mL IntraVENous Q8H    sodium chloride (NS) flush 5-40 mL  5-40 mL IntraVENous PRN    acetaminophen (TYLENOL) tablet 650 mg  650 mg Oral Q6H PRN    Or    acetaminophen (TYLENOL) suppository 650 mg  650 mg Rectal Q6H PRN    polyethylene glycol (MIRALAX) packet 17 g  17 g Oral DAILY PRN    ondansetron (ZOFRAN ODT) tablet 4 mg  4 mg Oral Q8H PRN    Or    ondansetron (ZOFRAN) injection 4 mg  4 mg IntraVENous Q6H PRN    0.9% sodium chloride infusion  125 mL/hr IntraVENous CONTINUOUS    glucose chewable tablet 16 g  4 Tablet Oral PRN    glucagon (GLUCAGEN) injection 1 mg  1 mg IntraMUSCular PRN    dextrose 10 % infusion 0-250 mL  0-250 mL IntraVENous PRN    insulin lispro (HUMALOG) injection   SubCUTAneous AC&HS     ______________________________________________________________________  EXPECTED LENGTH OF STAY: 1d 21h  ACTUAL LENGTH OF STAY:          3                 Neeraj Tripp MD stated

## 2022-12-13 NOTE — PROGRESS NOTES
Cardiac Cath Lab Procedure Area Arrival Note:    Brittney Murray arrived to Cardiac Cath Lab, Procedure Area. Patient identifiers verified with NAME and DATE OF BIRTH. Procedure verified with patient. Consent forms verified. Allergies verified. Patient informed of procedure and plan of care. Questions answered with review. Patient voiced understanding of procedure and plan of care. Patient on cardiac monitor, non-invasive blood pressure, SPO2 monitor. On O2 @ 1 lpm via NC.  IV of NS on pump at 125 ml/hr. Patient status doing well without problems. Patient is A&Ox 4. Patient reports no chest pain or dyspnea. Patient medicated during procedure with orders obtained and verified by Dr. Maykel Jackson. Refer to patients Cardiac Cath Lab PROCEDURE REPORT for vital signs, assessment, status, and response during procedure, printed at end of case. Printed report on chart or scanned into chart.

## 2022-12-13 NOTE — PROGRESS NOTES
Transfer to [address] [place] [address] from Procedure Area    Verbal report given to Dang on Zofia Marsh being transferred to Cardiac Cath Lab  for routine progression of care   Patient is post PCI procedure. Patient stable upon transfer to . Report consisted of patients Situation, Background, Assessment and   Recommendations(SBAR). Information from the following report(s)  was reviewed SBAR, Kardex, Procedure Summary, Intake/Output, Accordion, Cardiac Rhythm SR, and Alarm Parameters with the receiving nurse. Opportunity for questions and clarification was provided. Patient medicated during procedure with orders obtained and verified by Dr. Alexys Lozoya. Refer to patient PROCEDURE REPORT for vital signs, assessment, status, and response during procedure.

## 2022-12-13 NOTE — PROGRESS NOTES
TRANSFER - IN CATH LAB REPORT:    Verbal bedside report received from GLADYS Ochoa(name) on Bhavana Wood  being received from [facility](unit) for ordered procedure      Report consisted of patients Situation, Background, Assessment and   Recommendations(SBAR). Information from the following report(s) SBAR, ED Summary, and MAR was reviewed with the receiving nurse. Opportunity for questions and clarification was provided. Assessment completed upon patients arrival to unit and care assumed.

## 2022-12-13 NOTE — PROGRESS NOTES
Cardiac Cath Lab Recovery Arrival Note:      Jocelyn Craven arrived to Cardiac Cath Lab, Recovery Area. Staff introduced to patient. Patient identifiers verified with NAME and DATE OF BIRTH. Procedure verified with patient. Consent forms reviewed and signed by patient or authorized representative and verified. Allergies verified. Patient and family oriented to department. Patient and family informed of procedure and plan of care. Questions answered with review. Patient prepped for procedure, per orders from physician, prior to arrival.    Patient on cardiac monitor, non-invasive blood pressure, SPO2 monitor. On room air. Patient is A&Ox 4. Patient reports no complaints. Patient in stretcher, in low position, with side rails up, call bell within reach, patient instructed to call if assistance as needed. Patient prep in: [address], [place] [address]. Patient family has pager # waiting upstairs  Family in: [phone] wife Brandi Wilhelm.    Prep by: Nicolasa Garcia

## 2022-12-13 NOTE — PROGRESS NOTES
0730: Bedside and Verbal shift change report given to GLADYS Ochoa (oncoming nurse) by Dona Rojo RN (offgoing nurse). Report included the following information SBAR, Kardex, MAR, Recent Results, and Quality Measures. Care Plan:   Problem: Falls - Risk of  Goal: *Absence of Falls  Description: Document Columba Fall Risk and appropriate interventions in the flowsheet.   Outcome: Progressing Towards Goal  Note: Fall Risk Interventions:            Medication Interventions: Teach patient to arise slowly, Patient to call before getting OOB         History of Falls Interventions: Door open when patient unattended, Evaluate medications/consider consulting pharmacy         Problem: Patient Education: Go to Patient Education Activity  Goal: Patient/Family Education  Outcome: Progressing Towards Goal     Problem: General Medical Care Plan  Goal: *Fluid volume balance  Outcome: Progressing Towards Goal

## 2022-12-13 NOTE — PROCEDURES
Findings:  1) Severe aortic stenosis with mean gradient of about 50 mm Hg  2)Severe native 2 vessel CAD. Culprit distal RCA 99% stenosis as the cause of NSTEMI/USA  3)Selective bilateral iliac angiogram was performed by advancing the angles glide catheter through the aorta to first selectively engage left and then right common iliac artery which was visualized with DSA imaging. No significant disease was noted in bilateral NANCY< EIA and CFA. Bilateral IIA were patent and CFA was adequately sized for TF TAVR. 4)PCI of distal RCA with 3.5 by 15 mm Vijay DALIA  5)PCI of OM1 with 3.5 by 18 mm Vijay DALIA  6)Intermediate diffuse disease in LAD to be managed medially at this time.     Access: right radial no issues  Contrast 90 cc    Recommendation  1)Plavix based DAPT  2)GDMT for CAD  3)Follow up for aortic valve intervention

## 2022-12-13 NOTE — PROGRESS NOTES
TRANSFER - OUT REPORT:    Verbal report given to GLADYS Ochoa(name) on Gillian Mcconnell  being transferred to [facility](unit) for routine progression of care       Report consisted of patients Situation, Background, Assessment and   Recommendations(SBAR). Information from the following report(s) SBAR, Procedure Summary, Intake/Output, MAR, Recent Results, and Cardiac Rhythm Sinus with PACs  was reviewed with the receiving nurse. Lines:   Peripheral IV 12/10/22 Left Antecubital (Active)   Site Assessment Clean, dry, & intact 12/13/22 1157   Phlebitis Assessment 0 12/13/22 1157   Infiltration Assessment 0 12/13/22 1157   Dressing Status Clean, dry, & intact 12/13/22 0855   Dressing Type Transparent 12/13/22 1157   Hub Color/Line Status Pink;Flushed; Infusing 12/13/22 1157   Action Taken Open ports on tubing capped 12/13/22 1157   Alcohol Cap Used Yes 12/13/22 1157        Opportunity for questions and clarification was provided.       Patient transported with:   Monitor  Registered Nurse

## 2022-12-13 NOTE — PROGRESS NOTES
[facility]                       Cardiology Care Note     []Initial Consult     [x]Progress note    Patient Name: Viviane Paget - DS - THC:[id number]  Primary Cardiologist: [address] [place] [address] Cardiology Physicians: none  Consulting Cardiologist: [address] [place] [address] Cardiology Physicians: Re Figueroa MD     Reason for consult: chest pain    HPI:  Viviane Paget is a 74 y.o. male with PMH significant for HTN, DM, HLD, and obesity. He presented to ED with c/o chest pain. He reports that since August he has had about 4 episodes of chest pain, both with and without exertion. He reports associated SOB and pain that radiates down his loli arms. This past Thursday he had an echo done at [facility] hospital outpt after seeing PCP with c/o chest pain. Friday he had an episode of CP at rest, radiated down his arms and he was very dizzy. Saturday he didn't feel well and his SBP was around 200. He decided he should go to ED for further evaluation. His father had diabetes and passed from complications related to DM around age 79. No family hx of CAD. He reports he is able to do things with exertion and not have symptoms. Has not been able to identify the trigger. SUBJECTIVE:  Viviane Paget denies CP, SOB, orthopnea, PND or palpitations. Assessment and Plan     Unstable angina/abnormal stress test. Plan for cath today. Continue BB, ASA. H/o myalgias on simvastatin. Reassess statin depending on cath results. Continue zetia. HTN: Controlled. 3.   HLD: on Zetia. Reassess statin post cath. 4.   DM2: at home on Metformin, Glipizide.  Management per primary team.          ____________________________________________________________    Cardiac testing              Most recent HS troponins:  Recent Labs     12/10/22  5198 12/10/22  1453   TROPHS 25 21             Review of Systems:    [x]All other systems reviewed and all negative except as written in HPI    [] Patient unable to provide secondary to condition    Past Medical History:   Diagnosis Date    Diabetes ([address]     Hepatitis 1959    not active    Hypertension     Kidney stones     Malaria 1969    while serving in [facility]     Past Surgical History:   Procedure Laterality Date    HX KNEE REPLACEMENT Right 03/05/2019    Dr. Nivia Virgen Hx:  reports that he has never smoked. He has never used smokeless tobacco. He reports that he does not drink alcohol and does not use drugs. Family Hx: family history is not on file. No Known Allergies       OBJECTIVE:  Wt Readings from Last 3 Encounters:   12/12/22 247 lb 12.8 oz (112.4 kg)       Intake/Output Summary (Last 24 hours) at 12/13/2022 0837  Last data filed at 12/13/2022 0600  Gross per 24 hour   Intake 3037.5 ml   Output 0 ml   Net 3037.5 ml         Physical Exam:    Vitals:   Vitals:    12/13/22 0157 12/13/22 0400 12/13/22 0551 12/13/22 0636   BP:    (!) 104/51   Pulse: 63 70 69 74   Resp:    18   Temp:    98.1 °F (36.7 °C)   SpO2:       Weight:       Height:         Telemetry: normal sinus rhythm    Gen: Well-developed, well-nourished, in no acute distress  Neck: Supple, No JVD, No Carotid Bruit  Resp: No accessory muscle use, Clear breath sounds, No rales or rhonchi  Card: Regular Rate,Rythm, Normal S1, S2, + murmurs, no rubs or gallop. No thrills. Abd:   Soft, non-tender, non-distended, BS+   MSK: No cyanosis  Skin: No rashes    Neuro: Moving all four extremities, follows commands appropriately  Psych: Good insight, oriented to person, place, alert, Nml Affect  LE: No edema    Data Review:     Radiology:   XR Results (most recent):  Results from Hospital Encounter encounter on 12/10/22    XR CHEST PORT    Narrative  INDICATION: cp    EXAM:  AP CHEST RADIOGRAPH    COMPARISON: None    FINDINGS:    AP portable view of the chest demonstrates a normal cardiomediastinal  silhouette. There is no edema, effusion, consolidation, or pneumothorax.  The  osseous structures are unremarkable. Impression  No acute process. Recent Labs     12/12/22  0014 12/11/22  0105    136   K 4.4 4.3    106   CO2 26 28   BUN 28* 28*   CREA 1.39* 1.65*   * 182*   CA 8.2* 8.5       Recent Labs     12/10/22  1453   WBC 5.2   HGB 11.3*   HCT 34.0*          Recent Labs     12/10/22  1453   AP 79       No results for input(s): CHOL, LDLC in the last 72 hours.     No lab exists for component: TGL, HDLC,  HBA1C      Current meds:    Current Facility-Administered Medications:     saline peripheral flush soln 20 mL, 20 mL, InterCATHeter, PRN, Anabell Hernandez MD, 20 mL at 12/12/22 1130    metoprolol succinate (TOPROL-XL) XL tablet 25 mg, 25 mg, Oral, DAILY, Tello Solum D, NP, 25 mg at 12/12/22 1636    aspirin chewable tablet 81 mg, 81 mg, Oral, DAILY, David Diaz MD, 81 mg at 12/12/22 0823    amLODIPine (NORVASC) tablet 5 mg, 5 mg, Oral, DAILY, David Diaz MD, 5 mg at 12/12/22 0824    ezetimibe (ZETIA) tablet 10 mg, 10 mg, Oral, DAILY, David Diaz MD, 10 mg at 12/12/22 0824    tamsulosin (FLOMAX) capsule 0.4 mg, 0.4 mg, Oral, DAILY, David Diaz MD, 0.4 mg at 12/12/22 0823    sodium chloride (NS) flush 5-40 mL, 5-40 mL, IntraVENous, Q8H, David Diaz MD, 10 mL at 12/13/22 0531    sodium chloride (NS) flush 5-40 mL, 5-40 mL, IntraVENous, PRN, David Diaz MD    acetaminophen (TYLENOL) tablet 650 mg, 650 mg, Oral, Q6H PRN **OR** acetaminophen (TYLENOL) suppository 650 mg, 650 mg, Rectal, Q6H PRN, David Diaz MD    polyethylene glycol (MIRALAX) packet 17 g, 17 g, Oral, DAILY PRN, David Diaz MD    ondansetron (ZOFRAN ODT) tablet 4 mg, 4 mg, Oral, Q8H PRN **OR** ondansetron (ZOFRAN) injection 4 mg, 4 mg, IntraVENous, Q6H PRN, David Diaz MD    0.9% sodium chloride infusion, 125 mL/hr, IntraVENous, CONTINUOUS, David Diaz MD, Last Rate: 125 mL/hr at 12/13/22 0637, 125 mL/hr at 12/13/22 0637    glucose chewable tablet 16 g, 4 Tablet, Oral, PRN, David Diaz MD    glucagon (GLUCAGEN) injection 1 mg, 1 mg, IntraMUSCular, PRN, David Diaz MD    dextrose 10 % infusion 0-250 mL, 0-250 mL, IntraVENous, PRN, David Diaz MD    insulin lispro (HUMALOG) injection, , SubCUTAneous, AC&HS, David Diaz MD, 2 Units at 12/12/22 [facility] Jennifer Acevedo MD    [facility] Cardiology  Call center: [phone]      Ronnie Luo MD

## 2022-12-14 VITALS
TEMPERATURE: 98.4 F | SYSTOLIC BLOOD PRESSURE: 151 MMHG | BODY MASS INDEX: 33.98 KG/M2 | WEIGHT: 250.88 LBS | HEART RATE: 80 BPM | DIASTOLIC BLOOD PRESSURE: 41 MMHG | RESPIRATION RATE: 18 BRPM | OXYGEN SATURATION: 99 % | HEIGHT: 72 IN

## 2022-12-14 LAB
ECHO AO ROOT DIAM: 4 CM
ECHO AO ROOT INDEX: 1.72 CM/M2
ECHO AR MAX VEL PISA: 4.2 M/S
ECHO AV AREA PEAK VELOCITY: 1 CM2
ECHO AV AREA VTI: 1 CM2
ECHO AV AREA/BSA PEAK VELOCITY: 0.4 CM2/M2
ECHO AV AREA/BSA VTI: 0.4 CM2/M2
ECHO AV MEAN GRADIENT: 44 MMHG
ECHO AV MEAN VELOCITY: 3.2 M/S
ECHO AV PEAK GRADIENT: 74 MMHG
ECHO AV PEAK VELOCITY: 4.3 M/S
ECHO AV REGURGITANT PHT: 419 MILLISECOND
ECHO AV VELOCITY RATIO: 0.3
ECHO AV VTI: 119.1 CM
ECHO EST RA PRESSURE: 3 MMHG
ECHO LA DIAMETER INDEX: 1.93 CM/M2
ECHO LA DIAMETER: 4.5 CM
ECHO LA TO AORTIC ROOT RATIO: 1.13
ECHO LA VOL 2C: 125 ML (ref 18–58)
ECHO LA VOL 4C: 122 ML (ref 18–58)
ECHO LA VOLUME AREA LENGTH: 128 ML
ECHO LA VOLUME INDEX A2C: 54 ML/M2 (ref 16–34)
ECHO LA VOLUME INDEX A4C: 52 ML/M2 (ref 16–34)
ECHO LA VOLUME INDEX AREA LENGTH: 55 ML/M2 (ref 16–34)
ECHO LV E' LATERAL VELOCITY: 7 CM/S
ECHO LV E' SEPTAL VELOCITY: 6 CM/S
ECHO LV FRACTIONAL SHORTENING: 28 % (ref 28–44)
ECHO LV INTERNAL DIMENSION DIASTOLE INDEX: 2.15 CM/M2
ECHO LV INTERNAL DIMENSION DIASTOLIC: 5 CM (ref 4.2–5.9)
ECHO LV INTERNAL DIMENSION SYSTOLIC INDEX: 1.55 CM/M2
ECHO LV INTERNAL DIMENSION SYSTOLIC: 3.6 CM
ECHO LV IVSD: 1.1 CM (ref 0.6–1)
ECHO LV MASS 2D: 207.1 G (ref 88–224)
ECHO LV MASS INDEX 2D: 88.9 G/M2 (ref 49–115)
ECHO LV POSTERIOR WALL DIASTOLIC: 1.1 CM (ref 0.6–1)
ECHO LV RELATIVE WALL THICKNESS RATIO: 0.44
ECHO LVOT AREA: 3.5 CM2
ECHO LVOT AV VTI INDEX: 0.29
ECHO LVOT DIAM: 2.1 CM
ECHO LVOT MEAN GRADIENT: 4 MMHG
ECHO LVOT PEAK GRADIENT: 6 MMHG
ECHO LVOT PEAK VELOCITY: 1.3 M/S
ECHO LVOT STROKE VOLUME INDEX: 50.7 ML/M2
ECHO LVOT SV: 118 ML
ECHO LVOT VTI: 34.1 CM
ECHO MV A VELOCITY: 1.38 M/S
ECHO MV AREA VTI: 2.3 CM2
ECHO MV E DECELERATION TIME (DT): 311.9 MS
ECHO MV E VELOCITY: 1.21 M/S
ECHO MV E/A RATIO: 0.88
ECHO MV E/E' LATERAL: 17.29
ECHO MV E/E' RATIO (AVERAGED): 18.73
ECHO MV E/E' SEPTAL: 20.17
ECHO MV LVOT VTI INDEX: 1.5
ECHO MV MAX VELOCITY: 1.6 M/S
ECHO MV MEAN GRADIENT: 3 MMHG
ECHO MV MEAN VELOCITY: 0.8 M/S
ECHO MV PEAK GRADIENT: 10 MMHG
ECHO MV PRESSURE HALF TIME (PHT): 118.9 MS
ECHO MV PRESSURE HALF TIME (PHT): 90.5 MS
ECHO MV VTI: 51 CM
ECHO RIGHT VENTRICULAR SYSTOLIC PRESSURE (RVSP): 35 MMHG
ECHO RV FREE WALL PEAK S': 12 CM/S
ECHO RV INTERNAL DIMENSION: 3.9 CM
ECHO RV TAPSE: 2.9 CM (ref 1.7–?)
ECHO TV REGURGITANT MAX VELOCITY: 2.81 M/S
ECHO TV REGURGITANT PEAK GRADIENT: 32 MMHG
GLUCOSE BLD STRIP.AUTO-MCNC: 113 MG/DL (ref 65–117)
GLUCOSE BLD STRIP.AUTO-MCNC: 207 MG/DL (ref 65–117)
SERVICE CMNT-IMP: ABNORMAL
SERVICE CMNT-IMP: NORMAL

## 2022-12-14 PROCEDURE — 74011250637 HC RX REV CODE- 250/637: Performed by: NURSE PRACTITIONER

## 2022-12-14 PROCEDURE — 99233 SBSQ HOSP IP/OBS HIGH 50: CPT | Performed by: INTERNAL MEDICINE

## 2022-12-14 PROCEDURE — 74011636637 HC RX REV CODE- 636/637: Performed by: STUDENT IN AN ORGANIZED HEALTH CARE EDUCATION/TRAINING PROGRAM

## 2022-12-14 PROCEDURE — 93306 TTE W/DOPPLER COMPLETE: CPT | Performed by: INTERNAL MEDICINE

## 2022-12-14 PROCEDURE — 74011250636 HC RX REV CODE- 250/636: Performed by: STUDENT IN AN ORGANIZED HEALTH CARE EDUCATION/TRAINING PROGRAM

## 2022-12-14 PROCEDURE — 74011250637 HC RX REV CODE- 250/637: Performed by: STUDENT IN AN ORGANIZED HEALTH CARE EDUCATION/TRAINING PROGRAM

## 2022-12-14 PROCEDURE — 74011000250 HC RX REV CODE- 250: Performed by: STUDENT IN AN ORGANIZED HEALTH CARE EDUCATION/TRAINING PROGRAM

## 2022-12-14 PROCEDURE — 82962 GLUCOSE BLOOD TEST: CPT

## 2022-12-14 PROCEDURE — 74011250637 HC RX REV CODE- 250/637: Performed by: INTERNAL MEDICINE

## 2022-12-14 RX ORDER — METOPROLOL SUCCINATE 25 MG/1
25 TABLET, EXTENDED RELEASE ORAL DAILY
Qty: 30 TABLET | Refills: 2 | Status: SHIPPED | OUTPATIENT
Start: 2022-12-15

## 2022-12-14 RX ORDER — ROSUVASTATIN CALCIUM 5 MG/1
5 TABLET, COATED ORAL
Qty: 30 TABLET | Refills: 2 | Status: SHIPPED | OUTPATIENT
Start: 2022-12-14

## 2022-12-14 RX ORDER — CLOPIDOGREL BISULFATE 75 MG/1
75 TABLET ORAL DAILY
Qty: 30 TABLET | Refills: 2 | Status: SHIPPED | OUTPATIENT
Start: 2022-12-15

## 2022-12-14 RX ORDER — ROSUVASTATIN CALCIUM 10 MG/1
5 TABLET, COATED ORAL
Status: DISCONTINUED | OUTPATIENT
Start: 2022-12-14 | End: 2022-12-14 | Stop reason: HOSPADM

## 2022-12-14 RX ADMIN — METOPROLOL SUCCINATE 25 MG: 25 TABLET, EXTENDED RELEASE ORAL at 08:11

## 2022-12-14 RX ADMIN — CLOPIDOGREL BISULFATE 75 MG: 75 TABLET ORAL at 08:11

## 2022-12-14 RX ADMIN — ASPIRIN 81 MG: 81 TABLET, CHEWABLE ORAL at 08:10

## 2022-12-14 RX ADMIN — TAMSULOSIN HYDROCHLORIDE 0.4 MG: 0.4 CAPSULE ORAL at 08:11

## 2022-12-14 RX ADMIN — EZETIMIBE 10 MG: 10 TABLET ORAL at 08:11

## 2022-12-14 RX ADMIN — Medication 3 UNITS: at 12:06

## 2022-12-14 RX ADMIN — SODIUM CHLORIDE, PRESERVATIVE FREE 10 ML: 5 INJECTION INTRAVENOUS at 07:22

## 2022-12-14 RX ADMIN — AMLODIPINE BESYLATE 5 MG: 5 TABLET ORAL at 08:10

## 2022-12-14 RX ADMIN — SODIUM CHLORIDE, PRESERVATIVE FREE 10 ML: 5 INJECTION INTRAVENOUS at 13:40

## 2022-12-14 RX ADMIN — SODIUM CHLORIDE 125 ML/HR: 9 INJECTION, SOLUTION INTRAVENOUS at 02:00

## 2022-12-14 NOTE — PROGRESS NOTES
Problem: Falls - Risk of  Goal: *Absence of Falls  Description: Document Sandra Don Fall Risk and appropriate interventions in the flowsheet.   Outcome: Resolved/Met     Problem: Patient Education: Go to Patient Education Activity  Goal: Patient/Family Education  Outcome: Resolved/Met     Problem: General Medical Care Plan  Goal: *Vital signs within specified parameters  Outcome: Resolved/Met  Goal: *Labs within defined limits  Outcome: Resolved/Met  Goal: *Absence of infection signs and symptoms  Outcome: Resolved/Met  Goal: *Optimal pain control at patient's stated goal  Outcome: Resolved/Met  Goal: *Skin integrity maintained  Outcome: Resolved/Met  Goal: *Fluid volume balance  Outcome: Resolved/Met  Goal: *Optimize nutritional status  Outcome: Resolved/Met  Goal: *Anxiety reduced or absent  Outcome: Resolved/Met  Goal: *Progressive mobility and function (eg: ADL's)  Outcome: Resolved/Met     Problem: Patient Education: Go to Patient Education Activity  Goal: Patient/Family Education  Outcome: Resolved/Met

## 2022-12-14 NOTE — PROGRESS NOTES
[facility]                       Cardiology Care Note     []Initial Consult     [x]Progress note    Patient Name: Alisia Brittle - MLW:[id number] - PBZ:[id number]  Primary Cardiologist: [address] [place] [address] Cardiology Physicians: none  Consulting Cardiologist: [address] [place] [address] Cardiology Physicians: Jose Billy MD     Reason for consult: chest pain    HPI:  Alisia Brittle is a 74 y.o. male with PMH significant for HTN, DM, HLD, and obesity. He presented to ED with c/o chest pain. He reports that since August he has had about 4 episodes of chest pain, both with and without exertion. He reports associated SOB and pain that radiates down his loli arms. This past Thursday he had an echo done at [facility] outpt after seeing PCP with c/o chest pain. Friday he had an episode of CP at rest, radiated down his arms and he was very dizzy. Saturday he didn't feel well and his SBP was around 200. He decided he should go to ED for further evaluation. His father had diabetes and passed from complications related to DM around age 79. No family hx of CAD. He reports he is able to do things with exertion and not have symptoms. Has not been able to identify the trigger. SUBJECTIVE:  Alisia Brittle today denies CP, SOB, orthopnea, PND or palpitations. No CP since admission as he has been resting. Assessment and Plan     Unstable angina/abnormal stress test: cardiac cath yest with severe 2VD. PCI/DALIA to RCA/OM1 with diffuse LAD disease med management. Cont on Toprol, ASA, adding Plavix 75mg daily and Crestor 5mg daily. Continue zetia. Severe AS: noted severe AD with mean gradient 50mmHg. Plans for outpt follow up. HTN: Controlled. Cont current regimen  3. HLD: on Zetia. Adding Crestor 5mg daily. 4.   DM2: at home on Metformin, Glipizide. Management per primary team.     OK to d/c to home today with plans for office follow up.      Saw and evaluated pt and agree with assessment and plan. Doing well post cath and PCI. Compensated on exam. [id number] Ayde Mckeon for [facility] today and will follow outpt. Echo with normal EF and severe AS. Severe aortic stenosis was also noted on cardiac cath. Discussed with Dr. Moise Salazar and will assess for TAVR at some point within next few months. Adrienne Escobar MD       _      Review of Systems:    [x]All other systems reviewed and all negative except as written in HPI    [] Patient unable to provide secondary to condition    Past Medical History:   Diagnosis Date    Diabetes ([address]     Hepatitis 1959    not active    Hypertension     Kidney stones     Malaria 1969    while serving in [facility]     Past Surgical History:   Procedure Laterality Date    HX KNEE REPLACEMENT Right 03/05/2019    Dr. Alfredo Castaneda Hx:  reports that he has never smoked. He has never used smokeless tobacco. He reports that he does not drink alcohol and does not use drugs. Family Hx: family history is not on file. No Known Allergies       OBJECTIVE:  Wt Readings from Last 3 Encounters:   12/14/22 113.8 kg (250 lb 14.1 oz)       Intake/Output Summary (Last 24 hours) at 12/14/2022 0859  Last data filed at 12/13/2022 2330  Gross per 24 hour   Intake 450 ml   Output 400 ml   Net 50 ml       Physical Exam:    Vitals:   Vitals:    12/14/22 0200 12/14/22 0400 12/14/22 0600 12/14/22 0722   BP:    (!) 137/56   Pulse: 63 66 (!) 58 84   Resp:    20   Temp:    97.6 °F (36.4 °C)   SpO2:    95%   Weight:    113.8 kg (250 lb 14.1 oz)   Height:         Telemetry: normal sinus rhythm    Gen: Well-developed, well-nourished, in no acute distress  Neck: Supple, No JVD, No Carotid Bruit  Resp: No accessory muscle use, Clear breath sounds, No rales or rhonchi  Card: Regular Rate,Rhythm, Normal S1, S2, + murmurs, no rubs or gallop. No thrills. Right radial NL pulses/color/sensation.   Abd:   Soft, non-tender, non-distended, BS+   MSK: No cyanosis  Skin: No rashes    Neuro: Moving all four extremities, follows commands appropriately  Psych: Good insight, oriented to person, place, alert, Nml Affect  LE: No edema    Data Review:     Radiology:   XR Results (most recent):  Results from Hospital Encounter encounter on 12/10/22    XR CHEST PORT    Narrative  INDICATION: cp    EXAM:  AP CHEST RADIOGRAPH    COMPARISON: None    FINDINGS:    AP portable view of the chest demonstrates a normal cardiomediastinal  silhouette. There is no edema, effusion, consolidation, or pneumothorax. The  osseous structures are unremarkable. Impression  No acute process. Recent Labs     12/12/22  0014      K 4.4      CO2 26   BUN 28*   CREA 1.39*   *   CA 8.2*     No results for input(s): WBC, HGB, HCT, PLT, HGBEXT, HCTEXT, PLTEXT, HGBEXT, HCTEXT, PLTEXT in the last 72 hours. No results for input(s): PTP, INR, AP, INREXT, INREXT in the last 72 hours. No lab exists for component: PTTP, GPT, SGOT  No results for input(s): CHOL, LDLC in the last 72 hours.     No lab exists for component: TGL, HDLC,  HBA1C      Current meds:    Current Facility-Administered Medications:     rosuvastatin (CRESTOR) tablet 5 mg, 5 mg, Oral, QHS, Ronald CRUZ NP    clopidogreL (PLAVIX) tablet 75 mg, 75 mg, Oral, DAILY, Gilma Holman MD, 75 mg at 12/14/22 8167    metoprolol succinate (TOPROL-XL) XL tablet 25 mg, 25 mg, Oral, DAILY, Ronald CRUZ NP, 25 mg at 12/14/22 0425    aspirin chewable tablet 81 mg, 81 mg, Oral, DAILY, David Diaz MD, 81 mg at 12/14/22 0810    amLODIPine (NORVASC) tablet 5 mg, 5 mg, Oral, DAILY, David Diaz MD, 5 mg at 12/14/22 0810    ezetimibe (ZETIA) tablet 10 mg, 10 mg, Oral, DAILY, David Diaz MD, 10 mg at 12/14/22 0811    tamsulosin (FLOMAX) capsule 0.4 mg, 0.4 mg, Oral, DAILY, David Diaz MD, 0.4 mg at 12/14/22 0811    sodium chloride (NS) flush 5-40 mL, 5-40 mL, IntraVENous, Q8H, David Diaz MD, 10 mL at 12/14/22 0722    sodium chloride (NS) flush 5-40 mL, 5-40 mL, IntraVENous, PRN, David Diaz MD    acetaminophen (TYLENOL) tablet 650 mg, 650 mg, Oral, Q6H PRN **OR** acetaminophen (TYLENOL) suppository 650 mg, 650 mg, Rectal, Q6H PRN, David Diaz MD    polyethylene glycol (MIRALAX) packet 17 g, 17 g, Oral, DAILY PRN, David Diaz MD    ondansetron (ZOFRAN ODT) tablet 4 mg, 4 mg, Oral, Q8H PRN **OR** ondansetron (ZOFRAN) injection 4 mg, 4 mg, IntraVENous, Q6H PRN, David Diaz MD    0.9% sodium chloride infusion, 125 mL/hr, IntraVENous, CONTINUOUS, David Diaz MD, Last Rate: 125 mL/hr at 12/14/22 0200, 125 mL/hr at 12/14/22 0200    glucose chewable tablet 16 g, 4 Tablet, Oral, PRN, David Diaz MD    glucagon (GLUCAGEN) injection 1 mg, 1 mg, IntraMUSCular, PRN, David Diaz MD    dextrose 10 % infusion 0-250 mL, 0-250 mL, IntraVENous, PRN, David Diaz MD    insulin lispro (HUMALOG) injection, , SubCUTAneous, AC&HS, David Diaz MD, 2 Units at 12/12/22 Rosibeldarryl Lou NP    [facility] Cardiology  Call center: [phone]      Yani Kramer MD

## 2022-12-14 NOTE — PROGRESS NOTES
Problem: Falls - Risk of  Goal: *Absence of Falls  Description: Document Sandra Don Fall Risk and appropriate interventions in the flowsheet.   Outcome: Progressing Towards Goal  Note: Fall Risk Interventions:            Medication Interventions: Teach patient to arise slowly, Patient to call before getting OOB         History of Falls Interventions: Door open when patient unattended, Evaluate medications/consider consulting pharmacy         Problem: General Medical Care Plan  Goal: *Vital signs within specified parameters  Outcome: Progressing Towards Goal  Goal: *Labs within defined limits  Outcome: Progressing Towards Goal  Goal: *Absence of infection signs and symptoms  Outcome: Progressing Towards Goal  Goal: *Optimal pain control at patient's stated goal  Outcome: Progressing Towards Goal  Goal: *Skin integrity maintained  Outcome: Progressing Towards Goal  Goal: *Fluid volume balance  Outcome: Progressing Towards Goal  Goal: *Optimize nutritional status  Outcome: Progressing Towards Goal  Goal: *Anxiety reduced or absent  Outcome: Progressing Towards Goal  Goal: *Progressive mobility and function (eg: ADL's)  Outcome: Progressing Towards Goal

## 2022-12-14 NOTE — PROGRESS NOTES
0730: Bedside and Verbal shift change report given to GLADYS Ochoa (oncoming nurse) by Chan Rice RN (offgoing nurse). Report included the following information SBAR, Kardex, Intake/Output, MAR, Recent Results, and Quality Measures. Problem: Falls - Risk of  Goal: *Absence of Falls  Description: Document Reid Antunez Fall Risk and appropriate interventions in the flowsheet.   Outcome: Resolved/Met     Problem: Patient Education: Go to Patient Education Activity  Goal: Patient/Family Education  Outcome: Resolved/Met     Problem: General Medical Care Plan  Goal: *Vital signs within specified parameters  Outcome: Resolved/Met  Goal: *Labs within defined limits  Outcome: Resolved/Met  Goal: *Absence of infection signs and symptoms  Outcome: Resolved/Met  Goal: *Optimal pain control at patient's stated goal  Outcome: Resolved/Met  Goal: *Skin integrity maintained  Outcome: Resolved/Met  Goal: *Fluid volume balance  Outcome: Resolved/Met  Goal: *Optimize nutritional status  Outcome: Resolved/Met  Goal: *Anxiety reduced or absent  Outcome: Resolved/Met  Goal: *Progressive mobility and function (eg: ADL's)  Outcome: Resolved/Met     Problem: Patient Education: Go to Patient Education Activity  Goal: Patient/Family Education  Outcome: Resolved/Met     Problem: Patient Education: Go to Patient Education Activity  Goal: Patient/Family Education  Outcome: Resolved/Met     Problem: Cath Lab Procedures: Pre-Procedure  Goal: Off Pathway (Use only if patient is Off Pathway)  Outcome: Resolved/Met  Goal: Activity/Safety  Outcome: Resolved/Met  Goal: Consults, if ordered  Outcome: Resolved/Met  Goal: Diagnostic Test/Procedures  Outcome: Resolved/Met  Goal: Nutrition/Diet  Outcome: Resolved/Met  Goal: Discharge Planning  Outcome: Resolved/Met  Goal: Medications  Outcome: Resolved/Met  Goal: Respiratory  Outcome: Resolved/Met  Goal: Treatments/Interventions/Procedures  Outcome: Resolved/Met  Goal: Psychosocial  Outcome: Resolved/Met  Goal: *Verbalize description of procedure  Outcome: Resolved/Met  Goal: *Consent signed  Outcome: Resolved/Met     Problem: Cath Lab Procedures: Post-Cath Day of Procedure (Initiate SCIP Measures for Post-Op Care)  Goal: Off Pathway (Use only if patient is Off Pathway)  Outcome: Resolved/Met  Goal: Activity/Safety  Outcome: Resolved/Met  Goal: Consults, if ordered  Outcome: Resolved/Met  Goal: Diagnostic Test/Procedures  Outcome: Resolved/Met  Goal: Nutrition/Diet  Outcome: Resolved/Met  Goal: Discharge Planning  Outcome: Resolved/Met  Goal: Medications  Outcome: Resolved/Met  Goal: Respiratory  Outcome: Resolved/Met  Goal: Treatments/Interventions/Procedures  Outcome: Resolved/Met  Goal: Psychosocial  Outcome: Resolved/Met  Goal: *Procedure site is without bleeding and signs of infection six hours post sheath removal  Outcome: Resolved/Met  Goal: *Hemodynamically stable  Outcome: Resolved/Met  Goal: *Optimal pain control at patient's stated goal  Outcome: Resolved/Met     Problem: Cath Lab Procedures: Post-Cath Day 1  Goal: Off Pathway (Use only if patient is Off Pathway)  Outcome: Resolved/Met  Goal: Activity/Safety  Outcome: Resolved/Met  Goal: Diagnostic Test/Procedures  Outcome: Resolved/Met  Goal: Nutrition/Diet  Outcome: Resolved/Met  Goal: Discharge Planning  Outcome: Resolved/Met  Goal: Medications  Outcome: Resolved/Met  Goal: Respiratory  Outcome: Resolved/Met  Goal: Treatments/Interventions/Procedures  Outcome: Resolved/Met  Goal: Psychosocial  Outcome: Resolved/Met     Problem: Cath Lab Procedures: Discharge Outcomes  Goal: *Stable cardiac rhythm  Outcome: Resolved/Met  Goal: *Hemodynamically stable  Outcome: Resolved/Met  Goal: *Optimal pain control at patient's stated goal  Outcome: Resolved/Met  Goal: *Pulses palpable, skin color within defined limits, skin temperature warm  Outcome: Resolved/Met  Goal: *Lungs clear or at baseline  Outcome: Resolved/Met  Goal: *Demonstrates ability to perform prescribed activity without shortness of breath or discomfort  Outcome: Resolved/Met  Goal: *Verbalizes home exercise program, activity guidelines, cardiac precautions  Outcome: Resolved/Met  Goal: *Verbalizes understanding and describes prescribed diet  Outcome: Resolved/Met  Goal: *Verbalizes understanding and describes medication purposes and frequencies  Outcome: Resolved/Met  Goal: *Identifies cardiac risk factors  Outcome: Resolved/Met  Goal: *No signs and symptoms of infection or wound complications  Outcome: Resolved/Met  Goal: *Anxiety reduced or absent  Outcome: Resolved/Met  Goal: *Verbalizes and demonstrates incision care  Outcome: Resolved/Met  Goal: *Understands and describes signs and symptoms to report to providers(Stroke Metric)  Outcome: Resolved/Met  Goal: *Describes follow-up/return visits to physicians  Outcome: Resolved/Met  Goal: *Describes available resources and support systems  Outcome: Resolved/Met  Goal: *Influenza immunization  Outcome: Resolved/Met  Goal: *Pneumococcal immunization  Outcome: Resolved/Met     5507: I have reviewed discharge instructions with the patient and spouse. The patient and spouse verbalized understanding. Current Discharge Medication List        START taking these medications    Details   clopidogreL (PLAVIX) 75 mg tab Take 1 Tablet by mouth daily. Qty: 30 Tablet, Refills: 2  Start date: 12/15/2022      metoprolol succinate (TOPROL-XL) 25 mg XL tablet Take 1 Tablet by mouth daily. Qty: 30 Tablet, Refills: 2  Start date: 12/15/2022      rosuvastatin (CRESTOR) 5 mg tablet Take 1 Tablet by mouth nightly. Qty: 30 Tablet, Refills: 2  Start date: 12/14/2022           CONTINUE these medications which have NOT CHANGED    Details   metFORMIN (GLUCOPHAGE) 500 mg tablet Take  by mouth two (2) times daily (with meals). ezetimibe (ZETIA) 10 mg tablet Take  by mouth. tamsulosin (FLOMAX) 0.4 mg capsule Take 0.4 mg by mouth daily.       amLODIPine (NORVASC) 5 mg tablet Take 5 mg by mouth daily. glipiZIDE SR (GLUCOTROL XL) 5 mg CR tablet Take  by mouth daily. aspirin 81 mg chewable tablet Take 81 mg by mouth daily. cyanocobalamin (VITAMIN B12) 1,000 mcg/mL injection 1,000 mcg by IntraMUSCular route once.            STOP taking these medications       ramipriL (ALTACE) 5 mg capsule Comments:   Reason for Stopping:         hydroCHLOROthiazide (HYDRODIURIL) 25 mg tablet Comments:   Reason for Stopping:         pioglitazone (ACTOS) 45 mg tablet Comments:   Reason for Stopping:

## 2022-12-14 NOTE — PROGRESS NOTES
Transitions of Care Plan  RUR: 7% - low  Clinical Update: s/p cath; stable for d/c  Consults: Cardiology  Baseline: independent without DME; resides w wife  Barriers to Discharge: none  Disposition: home with family  Estimated Discharge Date: 12/14/22    CM spoke with attending MD. Patient medically stable for hospital discharge today. CM participated in [facility] Discharge with bedside nursing and   attending, that includes: Follow up appointments with PCP or specialist  Review of medications  Dispatch health information  Education on symptom management    All questions answered and patient concerns addressed SMAART-E checklist completed and placed in appropriate folder. Medicare pt has received, reviewed, and signed 2nd IM letter informing them of their right to appeal the discharge. Signed copied has been placed on pt bedside chart.     Disposition:  Home with family    Mayra Rivera, [address]  Available via [address] [place] [address] or

## 2022-12-14 NOTE — PROGRESS NOTES
[facility]                                                                                          Hospitalist Progress Note  Russ Ireland MD  Answering service: [phone] from in house phone        Date of Service:  2022  NAME:  Hallie Katz  :  1950  MRN:  [id number]      Admission Summary:   Hallie Katz is a 74 y.o. male with hx of niddm ii, htn, dyslipidemia, bph, obesity who presented to ed with complaints of chest pain. Pt endorses intermittent episodes of chest pain over the last 5 months. Reports pressure-like chest pain occurring bi-weekly with radiation down both his arms. Other associated symptoms have included sob, malaise and fatigue  The patient currently feel well and denies any ongoing fever, chills, chest or abdominal pain, nausea, vomiting, cough, congestion, recent illness, palpitations, or dysuria. Remarkable vitals on ER Presentations: vss  Labs Remarkable for: cr 1.92, flu 176,   ER Images: cxr: no acute process  ER Rx: none       Interval history / Subjective: Follow up Chest pain   No more chest pain, SOB, dizziness  S/p cardiac cath   Feels better  Wife in the room  Assessment & Plan:     Unstable Angina  -c/w serial trops  -abnormal stress test  -s/p cardiac cath  with severe 2VD. PCI/DALIA to RCA/OM1 with diffuse LAD disease   -Appreciate Cardiology, cleared for discharge     Severe aortic stenosis: Outpatient follow up      IDDM II: SSI +Hypoglycemic protocols  MARISEL vs CKD: mivf. hold losartan  BPH: home flomax  Dyslipidemia: c/w home zetia  Obesity:Counseled on weight loss, dieting and exercise        Diabetic diet after that       Code status: FULL CODE  Prophylaxis: Lovenox  PTA: home  Baseline:  Independent    Plan: Discharge home  Care Plan discussed with: patient  Anticipated Disposition: home     Hospital Problems  Date Reviewed: 2022            Codes Class Noted POA    * (Principal) Chest pain ICD-10-CM: R07.9  ICD-9-CM: 786.50  12/10/2022 Yes         Review of Systems:   A comprehensive review of systems was negative except for that written in the HPI. Vital Signs:    Last 24hrs VS reviewed since prior progress note. Most recent are:  Visit Vitals  BP (!) 151/41 (BP 1 Location: Left arm, BP Patient Position: At rest)   Pulse 66   Temp 98.4 °F (36.9 °C)   Resp 18   Ht 6' (1.829 m)   Wt 113.8 kg (250 lb 14.1 oz)   SpO2 99%   BMI 34.03 kg/m²         Intake/Output Summary (Last 24 hours) at 12/14/2022 1348  Last data filed at 12/14/2022 0051  Gross per 24 hour   Intake 2164.59 ml   Output 300 ml   Net 1864.59 ml          Physical Examination:     I had a face to face encounter with this patient and independently examined them on 12/14/2022 as outlined below:          Constitutional:  No acute distress, cooperative, pleasant    ENT:  Oral mucosa moist, oropharynx benign. Resp:  CTA bilaterally. No wheezing/rhonchi/rales. No accessory muscle use. CV:  Regular rhythm, normal rate, no murmurs, gallops, rubs    GI:  Soft, non distended, non tender. normoactive bowel sounds, no hepatosplenomegaly     Musculoskeletal:  No edema, warm, 2+ pulses throughout    Neurologic:  Moves all extremities. AAOx3, CN II-XII reviewed            Data Review:    Review and/or order of clinical lab test      Labs:     No results for input(s): WBC, HGB, HCT, PLT, HGBEXT, HCTEXT, PLTEXT, HGBEXT, HCTEXT, PLTEXT in the last 72 hours. Recent Labs     12/12/22  0014      K 4.4      CO2 26   BUN 28*   CREA 1.39*   *   CA 8.2*       No results for input(s): ALT, AP, TBIL, TBILI, TP, ALB, GLOB, GGT, AML, LPSE in the last 72 hours. No lab exists for component: SGOT, GPT, AMYP, HLPSE    No results for input(s): INR, PTP, APTT, INREXT, INREXT in the last 72 hours. No results for input(s): FE, TIBC, PSAT, FERR in the last 72 hours.    No results found for: FOL, RBCF   No results for input(s): PH, PCO2, PO2 in the last 72 hours. No results for input(s): CPK, CKNDX, TROIQ in the last 72 hours.     No lab exists for component: CPKMB  No results found for: CHOL, CHOLX, CHLST, CHOLV, HDL, HDLP, LDL, LDLC, DLDLP, TGLX, TRIGL, TRIGP, CHHD, CHHDX  Lab Results   Component Value Date/Time    Glucose (POC) 207 (H) 12/14/2022 11:08 AM    Glucose (POC) 113 12/14/2022 07:21 AM    Glucose (POC) 150 (H) 12/13/2022 10:21 PM    Glucose (POC) 110 12/13/2022 04:05 PM    Glucose (POC) 118 (H) 12/13/2022 11:17 AM     No results found for: COLOR, APPRN, SPGRU, REFSG, SUYAPA, PROTU, GLUCU, KETU, BILU, UROU, MARIAM, LEUKU, GLUKE, EPSU, BACTU, WBCU, RBCU, CASTS, UCRY      Medications Reviewed:     Current Facility-Administered Medications   Medication Dose Route Frequency    rosuvastatin (CRESTOR) tablet 5 mg  5 mg Oral QHS    clopidogreL (PLAVIX) tablet 75 mg  75 mg Oral DAILY    metoprolol succinate (TOPROL-XL) XL tablet 25 mg  25 mg Oral DAILY    aspirin chewable tablet 81 mg  81 mg Oral DAILY    amLODIPine (NORVASC) tablet 5 mg  5 mg Oral DAILY    ezetimibe (ZETIA) tablet 10 mg  10 mg Oral DAILY    tamsulosin (FLOMAX) capsule 0.4 mg  0.4 mg Oral DAILY    sodium chloride (NS) flush 5-40 mL  5-40 mL IntraVENous Q8H    sodium chloride (NS) flush 5-40 mL  5-40 mL IntraVENous PRN    acetaminophen (TYLENOL) tablet 650 mg  650 mg Oral Q6H PRN    Or    acetaminophen (TYLENOL) suppository 650 mg  650 mg Rectal Q6H PRN    polyethylene glycol (MIRALAX) packet 17 g  17 g Oral DAILY PRN    ondansetron (ZOFRAN ODT) tablet 4 mg  4 mg Oral Q8H PRN    Or    ondansetron (ZOFRAN) injection 4 mg  4 mg IntraVENous Q6H PRN    glucose chewable tablet 16 g  4 Tablet Oral PRN    glucagon (GLUCAGEN) injection 1 mg  1 mg IntraMUSCular PRN    dextrose 10 % infusion 0-250 mL  0-250 mL IntraVENous PRN    insulin lispro (HUMALOG) injection   SubCUTAneous AC&HS     ______________________________________________________________________  EXPECTED LENGTH OF STAY: 1d 21h  ACTUAL LENGTH OF STAY:          Gwyndolyn Hammans, MD

## 2022-12-14 NOTE — DISCHARGE SUMMARY
Discharge Summary       PATIENT ID: Johann Carrillo  MRN: [id number]   YOB: 1950    DATE OF ADMISSION: 12/10/2022  2:43 PM    DATE OF DISCHARGE: 12/14/2022   PRIMARY CARE PROVIDER: Baltazar Royal MD     ATTENDING PHYSICIAN: Dr Dinh Valdivia  DISCHARGING PROVIDER: Dinh Valdivia MD    To contact this individual call [phone] and ask the  to page. If unavailable ask to be transferred the Adult Hospitalist Department. CONSULTATIONS: IP CONSULT TO CARDIOLOGY    PROCEDURES/SURGERIES: Procedure(s):  LEFT HEART CATH / CORONARY ANGIOGRAPHY  PERCUTANEOUS CORONARY INTERVENTION    DISCHARGE DIAGNOSES:   Unstable Angina  -c/w serial trops  -abnormal stress test  -s/p cardiac cath 12/13 with severe 2VD. PCI/DALIA to RCA/OM1 with diffuse LAD disease   -Appreciate Cardiology, cleared for discharge     Severe aortic stenosis: Outpatient follow up      IDDM II: SSI +Hypoglycemic protocols  MARISEL vs CKD: mivf. hold losartan  BPH: home flomax  Dyslipidemia: c/w home zetia  Obesity:Counseled on weight loss, dieting and exercise      ADDITIONAL CARE RECOMMENDATIONS:   Follow up with PMD  Follow up with Cardiology     NOTIFY YOUR PHYSICIAN FOR ANY OF THE FOLLOWING:   Fever over 101 degrees for 24 hours. Chest pain, shortness of breath, fever, chills, nausea, vomiting, diarrhea, change in mentation, falling, weakness, bleeding. Severe pain or pain not relieved by medications, as well as any other signs or symptoms that you may have questions about. FOLLOW UP APPOINTMENTS:    Follow-up Information       Follow up With Specialties Details Why Contact Info Additional Information    [address] Cardiac Rehabilitation Follow up cardiac rehab [address] [place] [address] [address] [place] [address],[address] [place] [address] Carolee Mckeon, [address].   Please arrive 15 minutes prior to your appointment time and you will register in the [facility], [address], on the first floor of the [facility] Building. Telephone: [phone] Fax: [phone] Driving directions To [facility] [place]. Building: Driving WEST on [address], take exit [address] to [employer]. Turn left onto [facility] [address], then turn right into [employer] lot Driving EAST on [address], take exit [address] Turn right at the end of the exit ramp. Turn left onto [facility], then turn right into [employer] lot. Lexis Jackson MD Family Medicine Follow up in 1 week(s)  [address]  [place] [address] [facility]       Tatiana Donnelly MD Cardiovascular Disease Physician Follow up in 1 week(s)  [address] [facility]  [phone]                  DIET: Diabetic Diet    ACTIVITY: Activity as tolerated, no strenuous activity till cleared by Cardiologist    DISCHARGE MEDICATIONS:  Current Discharge Medication List        START taking these medications    Details   clopidogreL (PLAVIX) 75 mg tab Take 1 Tablet by mouth daily. Qty: 30 Tablet, Refills: 2  Start date: 12/15/2022      metoprolol succinate (TOPROL-XL) 25 mg XL tablet Take 1 Tablet by mouth daily. Qty: 30 Tablet, Refills: 2  Start date: 12/15/2022      rosuvastatin (CRESTOR) 5 mg tablet Take 1 Tablet by mouth nightly. Qty: 30 Tablet, Refills: 2  Start date: 12/14/2022           CONTINUE these medications which have NOT CHANGED    Details   metFORMIN (GLUCOPHAGE) 500 mg tablet Take  by mouth two (2) times daily (with meals). ezetimibe (ZETIA) 10 mg tablet Take  by mouth. tamsulosin (FLOMAX) 0.4 mg capsule Take 0.4 mg by mouth daily. amLODIPine (NORVASC) 5 mg tablet Take 5 mg by mouth daily. glipiZIDE SR (GLUCOTROL XL) 5 mg CR tablet Take  by mouth daily. aspirin 81 mg chewable tablet Take 81 mg by mouth daily. cyanocobalamin (VITAMIN B12) 1,000 mcg/mL injection 1,000 mcg by IntraMUSCular route once.            STOP taking these medications ramipriL (ALTACE) 5 mg capsule Comments:   Reason for Stopping:         hydroCHLOROthiazide (HYDRODIURIL) 25 mg tablet Comments:   Reason for Stopping:         pioglitazone (ACTOS) 45 mg tablet Comments:   Reason for Stopping:               DISPOSITION:  x  Home With:   OT  PT  HH  RN       Long term SNF/Inpatient Rehab    Independent/assisted living    Hospice    Other:       PATIENT CONDITION AT DISCHARGE:     Functional status    Poor     Deconditioned    x Independent      Cognition   x  Lucid     Forgetful     Dementia      Catheters/lines (plus indication)    Ceja     PICC     PEG    x None      Code status    x Full code     DNR      PHYSICAL EXAMINATION AT DISCHARGE:  Please see progress note      CHRONIC MEDICAL DIAGNOSES:  Problem List as of 12/14/2022 Date Reviewed: 12/11/2022            Codes Class Noted - Resolved    * (Principal) Chest pain ICD-10-CM: R07.9  ICD-9-CM: 786.50  12/10/2022 - Present           Greater than 38 minutes were spent with the patient on counseling and coordination of care    Signed:   Lisa Granda MD  12/14/2022  1:58 PM    .

## 2022-12-14 NOTE — DISCHARGE INSTRUCTIONS
Discharge Instructions       PATIENT ID: Marquise Magallon  MRN: [id number]   YOB: 1950    DATE OF ADMISSION: [unfilled]    DATE OF DISCHARGE: 12/14/2022    PRIMARY CARE PROVIDER: @PCP@     ATTENDING PHYSICIAN: [unfilled]  DISCHARGING PROVIDER: Naman Vu MD    To contact this individual call [phone] and ask the  to page. If unavailable ask to be transferred the Adult Hospitalist Department. DISCHARGE DIAGNOSES Unstable angina    CONSULTATIONS: Cardiology    PROCEDURES/SURGERIES: Procedure(s):  LEFT HEART CATH / CORONARY ANGIOGRAPHY  PERCUTANEOUS CORONARY INTERVENTION    PENDING TEST RESULTS:   At the time of discharge the following test results are still pending: none    FOLLOW UP APPOINTMENTS:   PCP  Cardiologist    ADDITIONAL CARE RECOMMENDATIONS:   As above    DIET: Diabetic Diet    ACTIVITY: Activity as tolerated      DISCHARGE MEDICATIONS:   See Medication Reconciliation Form    It is important that you take the medication exactly as they are prescribed. Keep your medication in the bottles provided by the pharmacist and keep a list of the medication names, dosages, and times to be taken in your wallet. Do not take other medications without consulting your doctor. NOTIFY YOUR PHYSICIAN FOR ANY OF THE FOLLOWING:   Fever over 101 degrees for 24 hours. Chest pain, shortness of breath, fever, chills, nausea, vomiting, diarrhea, change in mentation, falling, weakness, bleeding. Severe pain or pain not relieved by medications. Or, any other signs or symptoms that you may have questions about.       DISPOSITION:  x  Home With:   OT  PT  HH  RN       SNF/Inpatient Rehab/LTAC    Independent/assisted living    Hospice    Other:     CDMP Checked:   Yes x     PROBLEM LIST Updated:  Yes x       Signed:   Naman Vu MD  12/14/2022  1:57 PM

## 2022-12-15 NOTE — PROGRESS NOTES
Physician Progress Note      PATIENT:               Galen Rodriguez  CSN #:                  [id number]  :                       1950  ADMIT DATE:       12/10/2022 2:43 PM  Michelle Young [place] DATE:        2022 3:50 PM  RESPONDING  PROVIDER #:        Jourdan Lawrence MD          QUERY TEXT:    Pt admitted with angina. Noted documentation of NSTEMI by cardiology consultant. If possible, please document in progress notes and discharge summary:    The medical record reflects the following:  Risk Factors: angina  Clinical Indicators:  procedure  1) Severe aortic stenosis with mean gradient of about 50 mm Hg  2)Severe native 2 vessel CAD. Culprit distal RCA 99% stenosis as the cause of NSTEMI/[place]    Treatment: DALIA placed    Thank you,  Hoa Ridley RN, CDI, [facility] \[facility] [place] [facility]\"", CCDS  Certified Clinical Documentation   [phone]  You can also contact me via [address] [place] [address]. Options provided:  -- NSTEMI confirmed present on admission  -- Defer to cardiology  consultant documentation regarding NSTEMI  -- Other - I will add my own diagnosis  -- Disagree - Not applicable / Not valid  -- Disagree - Clinically unable to determine / Unknown  -- Refer to Clinical Documentation Reviewer    PROVIDER RESPONSE TEXT:    The diagnosis of NSTEMI was confirmed as present on admission.     Query created by: Kathy Oneil on 2022 3:08 PM      Electronically signed by:  Jourdan Lawrence MD 2022 11:30 PM

## 2022-12-16 ENCOUNTER — TELEPHONE (OUTPATIENT)
Dept: CARDIOLOGY CLINIC | Age: 72
End: 2022-12-16

## 2022-12-16 DIAGNOSIS — I35.0 NONRHEUMATIC AORTIC VALVE STENOSIS: Primary | ICD-10-CM

## 2022-12-19 NOTE — TELEPHONE ENCOUNTER
Spoke to Patient to set up an appointment in the valve clinic following his PCI to discuss TAVR. Patient did not want to set up an appointment at this time. Patient was given information to call scheduling to get his CTA ordered. Will call the patient back after Eduar to discuss follow up.

## 2022-12-28 ENCOUNTER — OFFICE VISIT (OUTPATIENT)
Dept: CARDIOLOGY CLINIC | Age: 72
End: 2022-12-28
Payer: MEDICARE

## 2022-12-28 VITALS
DIASTOLIC BLOOD PRESSURE: 84 MMHG | WEIGHT: 252 LBS | HEART RATE: 70 BPM | BODY MASS INDEX: 34.13 KG/M2 | RESPIRATION RATE: 14 BRPM | SYSTOLIC BLOOD PRESSURE: 130 MMHG | OXYGEN SATURATION: 97 % | HEIGHT: 72 IN

## 2022-12-28 DIAGNOSIS — I25.10 CORONARY ARTERY DISEASE INVOLVING NATIVE CORONARY ARTERY OF NATIVE HEART WITHOUT ANGINA PECTORIS: Primary | ICD-10-CM

## 2022-12-28 DIAGNOSIS — I35.0 SEVERE AORTIC STENOSIS: ICD-10-CM

## 2022-12-28 DIAGNOSIS — E78.2 MIXED HYPERLIPIDEMIA: ICD-10-CM

## 2022-12-28 DIAGNOSIS — I10 BENIGN ESSENTIAL HTN: ICD-10-CM

## 2022-12-28 DIAGNOSIS — E11.8 DM TYPE 2, CONTROLLED, WITH COMPLICATION (HCC): ICD-10-CM

## 2022-12-28 PROCEDURE — 99214 OFFICE O/P EST MOD 30 MIN: CPT | Performed by: INTERNAL MEDICINE

## 2022-12-28 PROCEDURE — G8417 CALC BMI ABV UP PARAM F/U: HCPCS | Performed by: INTERNAL MEDICINE

## 2022-12-28 PROCEDURE — 3078F DIAST BP <80 MM HG: CPT | Performed by: INTERNAL MEDICINE

## 2022-12-28 PROCEDURE — 1111F DSCHRG MED/CURRENT MED MERGE: CPT | Performed by: INTERNAL MEDICINE

## 2022-12-28 PROCEDURE — 1123F ACP DISCUSS/DSCN MKR DOCD: CPT | Performed by: INTERNAL MEDICINE

## 2022-12-28 PROCEDURE — 3017F COLORECTAL CA SCREEN DOC REV: CPT | Performed by: INTERNAL MEDICINE

## 2022-12-28 PROCEDURE — 2022F DILAT RTA XM EVC RTNOPTHY: CPT | Performed by: INTERNAL MEDICINE

## 2022-12-28 PROCEDURE — 3074F SYST BP LT 130 MM HG: CPT | Performed by: INTERNAL MEDICINE

## 2022-12-28 PROCEDURE — 3044F HG A1C LEVEL LT 7.0%: CPT | Performed by: INTERNAL MEDICINE

## 2022-12-28 PROCEDURE — G8427 DOCREV CUR MEDS BY ELIG CLIN: HCPCS | Performed by: INTERNAL MEDICINE

## 2022-12-28 PROCEDURE — G8510 SCR DEP NEG, NO PLAN REQD: HCPCS | Performed by: INTERNAL MEDICINE

## 2022-12-28 PROCEDURE — 1101F PT FALLS ASSESS-DOCD LE1/YR: CPT | Performed by: INTERNAL MEDICINE

## 2022-12-28 PROCEDURE — G8536 NO DOC ELDER MAL SCRN: HCPCS | Performed by: INTERNAL MEDICINE

## 2022-12-28 RX ORDER — FUROSEMIDE 40 MG/1
40 TABLET ORAL DAILY
Qty: 90 TABLET | Refills: 3 | Status: SHIPPED | OUTPATIENT
Start: 2022-12-28

## 2022-12-28 NOTE — PROGRESS NOTES
[facility] [place]: Chadwick Wheeler  [phone]    HPI:   Mr. Harpreet Kelly 75 yo M with a recent hospitalization for unstable angina subsequent cardiac cath had CAD and PCI to the RCA and OM with findings of diffuse distal LAD disease, severe aortic stenosis was noted by echocardiogram, essential hypertension, type 2 diabetes, mixed hyperlipidemia. Since hospitalization he has been feeling better. No exertional chest pain. He does feel occasional slight \"spike\" in his chest that last few seconds. He has had some lower extremity edema and he was previously on hydrochlorothiazide. He is compensated on exam clear lungs he does have a IV/VI systolic murmur at the LUSB and 1+ lower extremity edema. Assessment/Plan:  Coronary artery disease. Status post PCI to the RCA and OM with drug-eluting stent on February restarted December 13 with Dr. Aishwarya Luis. Overall he is doing well and will continue aspirin and Plavix for 1 year, Toprol and statin. Will tentatively have him follow back in 3 months. Recommended he initiate cardiac rehab and will send referral.    Severe aortic stenosis. Will set him up to see Dr. Aishwarya Luis. I do think he will benefit from consideration for TAVR that will be needed within the next 6 to 12 months  Essential hypertension. Blood pressure controlled and no changes made  Mixed hyperlipidemia. Tolerating statin and Zetia  DM2   LE edema. Consistent with venous insufficiency and will add Lasix 40 mg he can take once daily. He  has a past medical history of Diabetes ([address] Hepatitis (1959), Hypertension, Kidney stones, and Malaria (1969). All other systems negative except as above. PE  Vitals:    12/28/22 0924   BP: 130/84   Pulse: 70   Resp: 14   SpO2: 97%   Weight: 252 lb (114.3 kg)   Height: 6' (1.829 m)    Body mass index is 34.18 kg/m².   General appearance - alert, well appearing, and in no distress  Mental status - affect appropriate to mood  Eyes - sclera anicteric, moist mucous membranes  Neck - supple, no JVD  Chest - clear to auscultation, no wheezes, rales or rhonchi  Heart - normal rate, regular rhythm, normal S1, S2, IV/VI systolic murmur  Abdomen - soft, nontender, nondistended, no masses or organomegaly  Back exam - full range of motion, no tenderness  Neurological - no focal deficits  Extremities - peripheral pulses normal, 1+ LE edema      Recent Labs:  No results found for: CHOL, CHOLX, CHLST, CHOLV, 147535, HDL, HDLP, LDL, LDLC, DLDLP, TGLX, TRIGL, TRIGP, CHHD, CHHDX  Lab Results   Component Value Date/Time    Creatinine 1.39 (H) 12/12/2022 12:14 AM     Lab Results   Component Value Date/Time    BUN 28 (H) 12/12/2022 12:14 AM     Lab Results   Component Value Date/Time    Potassium 4.4 12/12/2022 12:14 AM     Lab Results   Component Value Date/Time    Hemoglobin A1c 6.0 (H) 12/11/2022 01:05 AM     Lab Results   Component Value Date/Time    HGB 11.3 (L) 12/10/2022 02:53 PM     Lab Results   Component Value Date/Time    PLATELET 512 94/87/0724 02:53 PM       Reviewed:  Past Medical History:   Diagnosis Date    Diabetes ([facility] [address]     Hepatitis 1959    not active    Hypertension     Kidney stones     Malaria 1969    while serving in [employer] History     Tobacco Use   Smoking Status Never   Smokeless Tobacco Never     Social History     Substance and Sexual Activity   Alcohol Use Never     No Known Allergies    Current Outpatient Medications   Medication Sig    clopidogreL (PLAVIX) 75 mg tab Take 1 Tablet by mouth daily. metoprolol succinate (TOPROL-XL) 25 mg XL tablet Take 1 Tablet by mouth daily. rosuvastatin (CRESTOR) 5 mg tablet Take 1 Tablet by mouth nightly. metFORMIN (GLUCOPHAGE) 500 mg tablet Take  by mouth two (2) times daily (with meals). ezetimibe (ZETIA) 10 mg tablet Take  by mouth. tamsulosin (FLOMAX) 0.4 mg capsule Take 0.4 mg by mouth daily. amLODIPine (NORVASC) 5 mg tablet Take 5 mg by mouth daily.     glipiZIDE SR (GLUCOTROL XL) 5 mg CR tablet Take  by mouth daily. aspirin 81 mg chewable tablet Take 81 mg by mouth daily. cyanocobalamin (VITAMIN B12) 1,000 mcg/mL injection 1,000 mcg by IntraMUSCular route once. No current facility-administered medications for this visit.        Lance Pollard MD  [facility] heart and [facility] [place]  [address] [facility]  [address]

## 2022-12-28 NOTE — LETTER
Patient:  Jacob Feng   YOB: 1950  Date of Visit: 12/28/2022      Dear Millicent Gupta MD  [address] [address]  Via Fax: [phone]:    Mr. Macrina Yi 75 yo M with a recent hospitalization for unstable angina subsequent cardiac cath had CAD and PCI to the RCA and OM with findings of diffuse distal LAD disease, severe aortic stenosis was noted by echocardiogram, essential hypertension, type 2 diabetes, mixed hyperlipidemia. Since hospitalization he has been feeling better. No exertional chest pain. He does feel occasional slight \"spike\" in his chest that last few seconds. He has had some lower extremity edema and he was previously on hydrochlorothiazide. He is compensated on exam clear lungs he does have a IV/VI systolic murmur at the LUSB and 1+ lower extremity edema. Assessment/Plan:  1. Coronary artery disease. Status post PCI to the RCA and OM with drug-eluting stent on February restarted December 13 with Dr. Anmol Galarza. Overall he is doing well and will continue aspirin and Plavix for 1 year, Toprol and statin. Will tentatively have him follow back in 3 months. Recommended he initiate cardiac rehab and will send referral.    2. Severe aortic stenosis. Will set him up to see Dr. Anmol Galarza. I do think he will benefit from consideration for TAVR that will be needed within the next 6 to 12 months  3. Essential hypertension. Blood pressure controlled and no changes made  4. Mixed hyperlipidemia. Tolerating statin and Zetia  5. DM2   6. LE edema. Consistent with venous insufficiency and will add Lasix 40 mg he can take once daily. If you have questions, please do not hesitate to call me.     Sincerely,      Flores Anthony MD

## 2022-12-28 NOTE — PATIENT INSTRUCTIONS
Your physician ordered Lasix 40 mg a day. An appointment will be scheduled for you to see Dr. Dawna Suarez in the near future. Follow up with Dr. Mikey Clayton in 3 months. Your physician also ordered a referral to Cardiac rehab.  Please call at

## 2023-01-04 ENCOUNTER — TELEPHONE (OUTPATIENT)
Dept: CARDIOLOGY CLINIC | Age: 73
End: 2023-01-04

## 2023-01-04 NOTE — TELEPHONE ENCOUNTER
Spoke with the Patient's wife regarding scheduling CTA for TAVR workup. Mrs. Dylan Conklin said they were told he wouldn't need the valve surgery for 6 months to a year and that he would like to wait to have the scan done. The patient has an appointment for TAVR workup with Dr. Bubba Mtz on 01/18. I will reach out to Dr. Bubba Mtz to see if he would like the patient to proceed with the CTA or wait. Once I have spoken to Dr. Bubba Mtz, I will reach back out to the patient and his wife.

## 2023-01-13 DIAGNOSIS — Z95.5 STENTED CORONARY ARTERY: ICD-10-CM

## 2023-01-18 ENCOUNTER — HOSPITAL ENCOUNTER (OUTPATIENT)
Dept: CARDIAC REHAB | Age: 73
Discharge: HOME | End: 2023-01-18
Payer: MEDICARE

## 2023-01-18 ENCOUNTER — OFFICE VISIT (OUTPATIENT)
Dept: CARDIOLOGY CLINIC | Age: 73
End: 2023-01-18
Payer: MEDICARE

## 2023-01-18 VITALS
BODY MASS INDEX: 32.78 KG/M2 | WEIGHT: 242 LBS | SYSTOLIC BLOOD PRESSURE: 138 MMHG | RESPIRATION RATE: 16 BRPM | HEART RATE: 81 BPM | DIASTOLIC BLOOD PRESSURE: 62 MMHG | OXYGEN SATURATION: 96 % | HEIGHT: 72 IN

## 2023-01-18 VITALS — HEIGHT: 72 IN | WEIGHT: 246 LBS | BODY MASS INDEX: 33.32 KG/M2

## 2023-01-18 DIAGNOSIS — I10 ESSENTIAL HYPERTENSION: ICD-10-CM

## 2023-01-18 DIAGNOSIS — I35.0 NONRHEUMATIC AORTIC VALVE STENOSIS: Primary | ICD-10-CM

## 2023-01-18 DIAGNOSIS — E78.2 MIXED HYPERLIPIDEMIA: ICD-10-CM

## 2023-01-18 DIAGNOSIS — E11.9 TYPE 2 DIABETES MELLITUS WITHOUT COMPLICATION, WITHOUT LONG-TERM CURRENT USE OF INSULIN (HCC): ICD-10-CM

## 2023-01-18 PROCEDURE — 93798 PHYS/QHP OP CAR RHAB W/ECG: CPT

## 2023-01-18 PROCEDURE — 3046F HEMOGLOBIN A1C LEVEL >9.0%: CPT | Performed by: INTERNAL MEDICINE

## 2023-01-18 PROCEDURE — 2022F DILAT RTA XM EVC RTNOPTHY: CPT | Performed by: INTERNAL MEDICINE

## 2023-01-18 PROCEDURE — G8427 DOCREV CUR MEDS BY ELIG CLIN: HCPCS | Performed by: INTERNAL MEDICINE

## 2023-01-18 PROCEDURE — G8432 DEP SCR NOT DOC, RNG: HCPCS | Performed by: INTERNAL MEDICINE

## 2023-01-18 PROCEDURE — G8417 CALC BMI ABV UP PARAM F/U: HCPCS | Performed by: INTERNAL MEDICINE

## 2023-01-18 PROCEDURE — 3078F DIAST BP <80 MM HG: CPT | Performed by: INTERNAL MEDICINE

## 2023-01-18 PROCEDURE — 93797 PHYS/QHP OP CAR RHAB WO ECG: CPT

## 2023-01-18 PROCEDURE — 1123F ACP DISCUSS/DSCN MKR DOCD: CPT | Performed by: INTERNAL MEDICINE

## 2023-01-18 PROCEDURE — 3075F SYST BP GE 130 - 139MM HG: CPT | Performed by: INTERNAL MEDICINE

## 2023-01-18 PROCEDURE — G8536 NO DOC ELDER MAL SCRN: HCPCS | Performed by: INTERNAL MEDICINE

## 2023-01-18 PROCEDURE — 99204 OFFICE O/P NEW MOD 45 MIN: CPT | Performed by: INTERNAL MEDICINE

## 2023-01-18 PROCEDURE — 3017F COLORECTAL CA SCREEN DOC REV: CPT | Performed by: INTERNAL MEDICINE

## 2023-01-18 PROCEDURE — 1101F PT FALLS ASSESS-DOCD LE1/YR: CPT | Performed by: INTERNAL MEDICINE

## 2023-01-18 NOTE — CARDIO/PULMONARY
Cyndie Payan  74 y.o. \"VL\"   Presented to cardiac wellness for orientation and exercise tolerance test today with a primary diagnosis of stent 12/13/2022 . Cyndie Payan has a history of severe AS, LE edema, R TKR in which he uses a forearm crutch to assist with walking. Dr. Dhara Hendrickson is following him for possible TAVR in 6-12 mo. Mrs. Nik Rader says that they will be scheduling the CT as part of the evaluation of TAVR soon. Cardiac risk factors include dyslipidemia, diabetes mellitus, obesity, hypertension and these were reviewed with them. Cyndie Payan is  and lives with his supportive wife who is with him here today. 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 PHQ9 depression score, is 4 and this is considered mild. The result was discussed with patient who affirms score to be accurate. Patient had mild chest pain and SOB (which resolved once exercise completed) during 6 minute biostep and was in SR/ST with occasional PAC's and PVC's. VL will attend exercise 2 days a week . Education manual and a schedule were given and all questions were answered.

## 2023-01-18 NOTE — LETTER
1/18/2023    Patient: Diaz Bustamante   YOB: 1950   Date of Visit: 1/18/2023     Patsy Willingham MD  [address] [place] [address]  Via Fax: [phone]    Dear Patsy Willingham MD,      Thank you for referring Mr. Diaz Bustamante to [address] [place] [address] for evaluation. My notes for this consultation are attached. If you have questions, please do not hesitate to call me. I look forward to following your patient along with you.       Sincerely,    Charanjit Glez MD

## 2023-01-18 NOTE — PATIENT INSTRUCTIONS
Please call [phone] to schedule your CTA study. You will follow up in valve clinic close to early March.

## 2023-01-18 NOTE — PROGRESS NOTES
Dr. Monica Alba. [phone]            Cardiology valvular heart disease consult/Progress Note      Requesting/referring provider: Matias Shell MD, Dr. Anthony Cook    Reason for Consult: Aortic stenosis    Assessment/Plan:  1. Symptomatic severe aortic stenosis with NYHA class II symptoms  2. Severe coronary artery disease with recent non-STEMI, s/p PCI to RCA and OM1, moderate disease in LAD  3. Hypertension-  4. Hyperlipidemia  5. Diabetes mellitus  6. CKD stage IIIa secondary to diabetes    Ms. Wallace Alas is seen at the request of Dr. Anthony Cook.  He has reported improvement in his symptoms of chest pain following PCI to RCA and OM. However shortness of breath which has been there for the past 6 to 8 months continues to bother him and is likely secondary to symptomatic severe aortic stenosis. He would benefit from definitive intervention on his aortic valve. We will order a CTA for potential TAVR planning. He has some physical limitation because of right knee replacement and probably would be better suited for TAVR compared to surgical AVR unless valve anatomy appears prohibitive. I informed the patient that in the absence of any definitive therapy severe symptomatic aortic stenosis confers a 50% 2 to 5-year mortality. I specifically discussed TAVR related procedural risks such as vascular complication, risk of stroke, risk of pacemaker need post TAVR, risk of conversion to open surgery, death from TAVR procedure. I also discussed the expected benefits include improvement in functional status, reduction in angina, improvement in exertional tolerance from the procedure. Finally I reviewed expected length of stay in the recovery with the patient based on if the perioperative course is complicated versus uncomplicated. Patient and family voiced understanding and are willing to proceed with further course of action.         Investigations ordered: CT abdomen, chest, pelvis    []    High complexity decision making was performed  []    Patient is at high-risk of decompensation with multiple organ involvement  []    Complex/difficult social determinants of health outcomes  Total of 62 minutes were spent on reviewing the records, analyzing investigations and documentation in the chart, on the day of visit including time for examination and time spent with the patient  Investigations personally reviewed and interpreted  Cardiac catheterization from December was personally reviewed. It showed evidence of 90-99% lesion in distal RCA which was treated with a drug-eluting stent. He also had 75% focal stenosis in principal marginal branch of circumflex which was treated with a drug-eluting stent. Mid to distal LAD had diffuse long segment 60% stenosis which was deferred. Simultaneous pressure measurement across the aortic valve indicated a mean transaortic gradient of about 52 mmHg with calculated aortic valve area of 0.8 cm. ECG December 2022: Reviewed: Sinus rhythm, normal QRS duration, normal RI duration, nonspecific ST-T changes. Echocardiogram December 2022: Normal LV function, peak transaortic velocity of about 4.2 m/s with mean transaortic gradient of 43 mmHg. Accompanying mild aortic insufficiency no other critical valvular abnormalities. Investigations reviewed    HPI: Lacho Gonzales, a 74y.o. year-old who is seen for evaluation of chest pain and management of aortic stenosis. Ms. Shaq Blackman is seen at the request of Dr. Iva Mason.  She has history of coronary disease which manifested in the form of non-STEMI and recently underwent PCI to her RCA and OM1 with moderate diffuse disease in LAD. She also has severe aortic stenosis. Since undergoing PCI she has reported improvement in functional tolerance with no subsequent anginal chest discomfort except for occasional twinges of atypical chest pain. She does not report of any orthopnea or PND.   No recent syncope or dizziness is reported. He  has a past medical history of Diabetes ([address] Hepatitis (1959), Hypertension, Kidney stones, and Malaria (1969). Review of system:Patient reports no dyspnea/PND/Orthpnea/CP. She reports no cough/fever/focal neurological deficits/abdominal pain. All other systems negative except as above. No family history on file. Social History     Socioeconomic History    Marital status:    Tobacco Use    Smoking status: Never    Smokeless tobacco: Never   Substance and Sexual Activity    Alcohol use: Never    Drug use: Never      PE  Vitals:    01/18/23 1037   BP: 138/62   Pulse: 81   Resp: 16   SpO2: 96%   Weight: 242 lb (109.8 kg)   Height: 6' (1.829 m)    Body mass index is 32.82 kg/m². General:    Alert, cooperative, no distress. Psychiatric:    Normal Mood and affect    Eye/ENT:      Pupils equal, No asymmetry, Conjunctival pink. Able to hear voice at normal amplitude   Lungs:      Visibly symmetric chest expansion, No palpable tenderness. Clear to auscultation bilaterally. Heart[de-identified]    Regular rate and rhythm, S1, S2 normal, mid peaking grade 3/6 midsystolic murmur best heard in right upper sternal border. , click, rub or gallop. No JVD, Normal palpable peripheral pulses. No cyanosis   Abdomen:     Soft, non-tender. Bowel sounds normal. No masses,  No      organomegaly. Extremities:   Extremities normal, atraumatic, no edema. Neurologic:   CN II-XII grossly intact.  No gross focal deficits           Recent Labs:  No results found for: CHOL, CHOLX, CHLST, CHOLV, 498914, HDL, HDLP, LDL, LDLC, DLDLP, TGLX, TRIGL, TRIGP, CHHD, CHHDX  Lab Results   Component Value Date/Time    Creatinine 1.39 (H) 12/12/2022 12:14 AM     Lab Results   Component Value Date/Time    BUN 28 (H) 12/12/2022 12:14 AM     Lab Results   Component Value Date/Time    Potassium 4.4 12/12/2022 12:14 AM     Lab Results   Component Value Date/Time    Hemoglobin A1c 6.0 (H) 12/11/2022 01:05 AM     Lab Results Component Value Date/Time    HGB 11.3 (L) 12/10/2022 02:53 PM     Lab Results   Component Value Date/Time    PLATELET 875 56/04/0906 02:53 PM       Reviewed:  Past Medical History:   Diagnosis Date    Diabetes ([address]     Hepatitis 1959    not active    Hypertension     Kidney stones     Malaria 1969    while serving in [employer] [place] History     Tobacco Use   Smoking Status Never   Smokeless Tobacco Never     Social History     Substance and Sexual Activity   Alcohol Use Never     Allergies   Allergen Reactions    Simvastatin Other (comments)     No family history on file. Current Outpatient Medications   Medication Sig    furosemide (LASIX) 40 mg tablet Take 1 Tablet by mouth daily. clopidogreL (PLAVIX) 75 mg tab Take 1 Tablet by mouth daily. metoprolol succinate (TOPROL-XL) 25 mg XL tablet Take 1 Tablet by mouth daily. rosuvastatin (CRESTOR) 5 mg tablet Take 1 Tablet by mouth nightly. metFORMIN (GLUCOPHAGE) 500 mg tablet Take 2,000 mg by mouth two (2) times daily (with meals). TAKING 2 500MG TABLETS N THE AM & 2 500MG TABLETS IN THE PM    ezetimibe (ZETIA) 10 mg tablet Take  by mouth. tamsulosin (FLOMAX) 0.4 mg capsule Take 0.4 mg by mouth daily. amLODIPine (NORVASC) 5 mg tablet Take 5 mg by mouth daily. glipiZIDE SR (GLUCOTROL XL) 5 mg CR tablet Take  by mouth daily. aspirin 81 mg chewable tablet Take 81 mg by mouth daily. cyanocobalamin (VITAMIN B12) 1,000 mcg/mL injection 1,000 mcg by IntraMUSCular route once. No current facility-administered medications for this visit. ATTENTION:   This medical record was transcribed using an electronic medical records/speech recognition system. Although proofread, it may and can contain electronic, spelling and other errors. Corrections may be executed at a later time. Please feel free to contact us for any clarifications as needed.     [facility] [place]  [employer],  [place], [address] [facility] [phone]

## 2023-01-23 ENCOUNTER — TELEPHONE (OUTPATIENT)
Dept: CARDIOLOGY CLINIC | Age: 73
End: 2023-01-23

## 2023-01-23 NOTE — TELEPHONE ENCOUNTER
Patient's wife calling because the pt has a ct scan for 1/6 and need to know if he can stop his Plavix for this procedure     She can be reached at [phone]    [facility]

## 2023-01-23 NOTE — TELEPHONE ENCOUNTER
Spoke with wife. 2 patient identifiers used. Wife states she was told when she called to schedule CTA that she was told her  needed to stay off aspirin for 5 days and to check with Dr. Alida Lepe about holding Plavix. Let wife know I would check with Dr. Alida Lepe and Wilma Berrios NP about this. Wife verbalized understanding.

## 2023-01-24 ENCOUNTER — HOSPITAL ENCOUNTER (OUTPATIENT)
Dept: CARDIAC REHAB | Age: 73
Discharge: HOME | End: 2023-01-24
Payer: MEDICARE

## 2023-01-24 VITALS — WEIGHT: 242 LBS | BODY MASS INDEX: 32.82 KG/M2

## 2023-01-24 PROCEDURE — 93798 PHYS/QHP OP CAR RHAB W/ECG: CPT

## 2023-01-24 NOTE — TELEPHONE ENCOUNTER
Lester Manley, ANP  Jordan Lord RN  I can't recall the patient. I checked with CT about the CTA. There is NO reason to stop ASA or plavix prior to this for our TAVR or Watchman protocol. Thanks. Left HIPPA compliant message for wife to return call. Not able to notify of above.

## 2023-01-26 ENCOUNTER — TELEPHONE (OUTPATIENT)
Dept: CARDIOLOGY CLINIC | Age: 73
End: 2023-01-26

## 2023-01-26 ENCOUNTER — HOSPITAL ENCOUNTER (OUTPATIENT)
Dept: CARDIAC REHAB | Age: 73
Discharge: HOME | End: 2023-01-26
Payer: MEDICARE

## 2023-01-26 VITALS — BODY MASS INDEX: 32.41 KG/M2 | WEIGHT: 239 LBS

## 2023-01-26 PROCEDURE — 93798 PHYS/QHP OP CAR RHAB W/ECG: CPT

## 2023-01-26 PROCEDURE — 93797 PHYS/QHP OP CAR RHAB WO ECG: CPT

## 2023-01-26 NOTE — CARDIO/PULMONARY
Cardiac Rehab Nutrition Assessment - 1:1 Evaluation         NAME: Gaurav Ace : 1950 AGE: 74 y.o. GENDER: male  CARDIAC REHAB ADMITTING DIAGNOSIS: s/p stent x2 (22)    PROBLEM LIST:  Patient Active Problem List   Diagnosis Code    Chest pain R07.9     BG has been running higher, around 127 mg/dL since medication changes. Checks most every day, at least once/day in the morning. LABS:   Lab Results   Component Value Date/Time    Hemoglobin A1c 6.0 (H) 2022 01:05 AM     No results found for: CHOL, CHOLPOCT, CHOLX, CHLST, CHOLV, HDL, HDLPOC, HDLP, LDL, LDLCPOC, LDLC, DLDLP, VLDLC, VLDL, TGLX, TRIGL, TRIGP, TGLPOCT, CHHD, CHHDX      MEDICATIONS/SUPPLEMENTS:   Prior to Admission medications    Medication Sig Start Date End Date Taking? Authorizing Provider   furosemide (LASIX) 40 mg tablet Take 1 Tablet by mouth daily. 22   Fernando Olson MD   clopidogreL (PLAVIX) 75 mg tab Take 1 Tablet by mouth daily. 12/15/22   Kristine Restrepo MD   metoprolol succinate (TOPROL-XL) 25 mg XL tablet Take 1 Tablet by mouth daily. 12/15/22   Kristine Restrepo MD   rosuvastatin (CRESTOR) 5 mg tablet Take 1 Tablet by mouth nightly. 22   Kristine Restrepo MD   metFORMIN (GLUCOPHAGE) 500 mg tablet Take 2,000 mg by mouth two (2) times daily (with meals). TAKING 2 500MG TABLETS N THE AM & 2 500MG TABLETS IN THE PM    Cristal Sparks MD   ezetimibe (ZETIA) 10 mg tablet Take  by mouth. Cristal Sparks MD   tamsulosin (FLOMAX) 0.4 mg capsule Take 0.4 mg by mouth daily. Cristal Sparks MD   amLODIPine (NORVASC) 5 mg tablet Take 5 mg by mouth daily. Cristal Sparks MD   glipiZIDE SR (GLUCOTROL XL) 5 mg CR tablet Take  by mouth daily. Cristal Sparks MD   aspirin 81 mg chewable tablet Take 81 mg by mouth daily. Cristal Sparks MD   cyanocobalamin (VITAMIN B12) 1,000 mcg/mL injection 1,000 mcg by IntraMUSCular route once.   Patient not taking: Reported on 2023    Other, MD Cristal       ANTHROPOMETRICS:    Ht Readings from Last 1 Encounters:   01/18/23 6' (1.829 m)      Wt Readings from Last 10 Encounters:   01/24/23 109.8 kg (242 lb)   01/18/23 111.6 kg (246 lb)   01/18/23 109.8 kg (242 lb)   12/28/22 114.3 kg (252 lb)   12/14/22 113.8 kg (250 lb 14.1 oz)        BMI: 32.82 kg/M2 Category: obese (class I)  Waist (measured at intake):  inches    Reported Wt Hx:     Reported Diet Hx:    Rate Your Plate Score:  (Score 58-72: Making many healthy choices; 41-57: Some choices need improving 24-40: many choices need improving)    24 HOUR DIET RECALL  Breakfast Eggs & valera   Lunch    Dinner    Snacks    Beverages      Shamir Benavides presents with wife. Eats generally healthy, but struggles with portion control - specifically starches and sweets/beverages. Drinks one 12 oz pepsi daily. Most vegetables consumed are starchy; butter beans, potatoes. Pt and wife do not read labels. Reviewed nutrition label and importance of comparing for sodium, saturated fat, and added sugars. Recommended limiting starch portion to 1/4 of 9\" dinner plate or 09GF/ZOKD.     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 Estimated Energy Needs: 3682 (using [address] with AF 1.3)    NUTRITION INTERVENTION:  Nutrition 60 minute one-on-one education & goal setting with Shamir Benavides    Reviewed with Shamir Benavides relevant labs compared to ideals. Reviewed weight history and patient's verbalized weight goal as well as any real or perceived barriers to obtaining the goal. Collaborated with patient to set a specific short and long term weight goal.     Reviewed Rate Your Plate and conducted a verbal diet recall.   Assessed for environmental, financial, psychosocial, physical and comorbidities that may impact the food and eating patterns / behaviors of Shamir Benavides    Collaborated with patient to set specific nutrient goals as well as specific food / behavior changes that will help patient meet the overall goal of following a heart healthy eating pattern (using guidelines as set forth by the American Heart Association and modeled after healthful eating patterns as recognized by the USDA Dietary Guidelines such as DASH, Mediterranean or plant-based). Briefly reviewed with Rj Najera the nutrition information in the Cardiac Rehab patient education book and encouraged Rj Najera to read thoroughly, ask questions as needed, and use for future reference for heart healthy nutrition information. Gegekatie Dania is / is not scheduled to participate in Cardiac Rehab group nutrition classes. PATIENT GOALS:    Weight Goals:  Short Term Weight Goal:215-220 lbs  Long Term Weight Goal:200 lbs    Nutrition Goals:  Daily Recommendations:  Calories: 1900 /day  (for weight loss)  Saturated Fat: no more than 12 g/day  Trans Fat: 0 g/day  Sodium: no more than 2413-7873 mg/day  Fruit: 2 cups / day  Vegetables: 2-3 cups/day    Other:  - read and compare food labels  - reduce sugar-sweetened beverage intake by cutting back from 12 oz pepsi to 8 oz over the next month (goal is to completely wean off)  - limit starch portion to 1/4 of plate       Keeping a food diary was recommended. Questions addressed. Follow-up plans discussed. Rj Najera verbalized understanding.             Dulce Castro RD

## 2023-01-26 NOTE — TELEPHONE ENCOUNTER
Left a message for patient to call to schedule an appointment in the valve clinic for after his CTA which is scheduled for 02/16/2023.

## 2023-01-31 ENCOUNTER — HOSPITAL ENCOUNTER (OUTPATIENT)
Dept: CARDIAC REHAB | Age: 73
Discharge: HOME | End: 2023-01-31
Payer: MEDICARE

## 2023-01-31 VITALS — WEIGHT: 238 LBS | BODY MASS INDEX: 32.28 KG/M2

## 2023-01-31 PROCEDURE — 93798 PHYS/QHP OP CAR RHAB W/ECG: CPT

## 2023-02-02 ENCOUNTER — HOSPITAL ENCOUNTER (OUTPATIENT)
Dept: CARDIAC REHAB | Age: 73
Discharge: HOME | End: 2023-02-02
Payer: MEDICARE

## 2023-02-02 VITALS — WEIGHT: 239 LBS | BODY MASS INDEX: 32.41 KG/M2

## 2023-02-02 PROCEDURE — 93798 PHYS/QHP OP CAR RHAB W/ECG: CPT

## 2023-02-07 ENCOUNTER — HOSPITAL ENCOUNTER (OUTPATIENT)
Dept: CARDIAC REHAB | Age: 73
Discharge: HOME | End: 2023-02-07
Payer: MEDICARE

## 2023-02-07 VITALS — WEIGHT: 242 LBS | BODY MASS INDEX: 32.82 KG/M2

## 2023-02-07 PROCEDURE — 93798 PHYS/QHP OP CAR RHAB W/ECG: CPT

## 2023-02-08 NOTE — TELEPHONE ENCOUNTER
Spoke with wife. 2 patient identifiers used. Tri Grubbs, ANP  Abraham Joseph, RN  I can't recall the patient. I checked with CT about the CTA. There is NO reason to stop ASA or plavix prior to this for our TAVR or Watchman protocol. Thanks. Notified wife of above. Wife verbalized understanding. Had questions about patient eating or drinking prior to CTA, let wife know to call scheduling line number for the instructions on this.

## 2023-02-09 ENCOUNTER — HOSPITAL ENCOUNTER (OUTPATIENT)
Dept: CARDIAC REHAB | Age: 73
Discharge: HOME | End: 2023-02-09
Payer: MEDICARE

## 2023-02-09 VITALS — WEIGHT: 239 LBS | BODY MASS INDEX: 32.41 KG/M2

## 2023-02-09 PROCEDURE — 93798 PHYS/QHP OP CAR RHAB W/ECG: CPT

## 2023-02-14 ENCOUNTER — HOSPITAL ENCOUNTER (OUTPATIENT)
Dept: CARDIAC REHAB | Age: 73
Discharge: HOME | End: 2023-02-14
Payer: MEDICARE

## 2023-02-14 VITALS — BODY MASS INDEX: 32.14 KG/M2 | WEIGHT: 237 LBS

## 2023-02-14 PROCEDURE — 93798 PHYS/QHP OP CAR RHAB W/ECG: CPT

## 2023-02-16 ENCOUNTER — HOSPITAL ENCOUNTER (OUTPATIENT)
Dept: CT IMAGING | Age: 73
Discharge: HOME | End: 2023-02-16
Attending: INTERNAL MEDICINE
Payer: MEDICARE

## 2023-02-16 DIAGNOSIS — I35.0 NONRHEUMATIC AORTIC VALVE STENOSIS: ICD-10-CM

## 2023-02-16 PROCEDURE — 74174 CTA ABD&PLVS W/CONTRAST: CPT

## 2023-02-16 PROCEDURE — 74011000636 HC RX REV CODE- 636: Performed by: INTERNAL MEDICINE

## 2023-02-16 RX ADMIN — IOPAMIDOL 120 ML: 755 INJECTION, SOLUTION INTRAVENOUS at 11:57

## 2023-02-21 ENCOUNTER — HOSPITAL ENCOUNTER (OUTPATIENT)
Dept: CARDIAC REHAB | Age: 73
Discharge: HOME | End: 2023-02-21
Payer: MEDICARE

## 2023-02-21 VITALS — BODY MASS INDEX: 32.14 KG/M2 | WEIGHT: 237 LBS

## 2023-02-21 PROCEDURE — 93798 PHYS/QHP OP CAR RHAB W/ECG: CPT

## 2023-02-23 ENCOUNTER — OFFICE VISIT (OUTPATIENT)
Dept: CARDIOLOGY CLINIC | Age: 73
End: 2023-02-23
Payer: MEDICARE

## 2023-02-23 ENCOUNTER — OFFICE VISIT (OUTPATIENT)
Dept: CARDIOLOGY CLINIC | Age: 73
End: 2023-02-23

## 2023-02-23 VITALS
OXYGEN SATURATION: 97 % | HEART RATE: 86 BPM | WEIGHT: 237 LBS | HEIGHT: 72 IN | BODY MASS INDEX: 32.1 KG/M2 | TEMPERATURE: 98.1 F | DIASTOLIC BLOOD PRESSURE: 69 MMHG | SYSTOLIC BLOOD PRESSURE: 141 MMHG

## 2023-02-23 DIAGNOSIS — I35.0 NONRHEUMATIC AORTIC VALVE STENOSIS: ICD-10-CM

## 2023-02-23 DIAGNOSIS — I25.10 CORONARY ARTERY DISEASE INVOLVING NATIVE CORONARY ARTERY OF NATIVE HEART WITHOUT ANGINA PECTORIS: ICD-10-CM

## 2023-02-23 DIAGNOSIS — E78.2 MIXED HYPERLIPIDEMIA: ICD-10-CM

## 2023-02-23 DIAGNOSIS — I35.0 NONRHEUMATIC AORTIC VALVE STENOSIS: Primary | ICD-10-CM

## 2023-02-23 DIAGNOSIS — I10 ESSENTIAL HYPERTENSION: ICD-10-CM

## 2023-02-23 PROCEDURE — G8432 DEP SCR NOT DOC, RNG: HCPCS | Performed by: INTERNAL MEDICINE

## 2023-02-23 PROCEDURE — G8427 DOCREV CUR MEDS BY ELIG CLIN: HCPCS | Performed by: INTERNAL MEDICINE

## 2023-02-23 PROCEDURE — 1101F PT FALLS ASSESS-DOCD LE1/YR: CPT | Performed by: INTERNAL MEDICINE

## 2023-02-23 PROCEDURE — 3017F COLORECTAL CA SCREEN DOC REV: CPT | Performed by: INTERNAL MEDICINE

## 2023-02-23 PROCEDURE — G8432 DEP SCR NOT DOC, RNG: HCPCS | Performed by: THORACIC SURGERY (CARDIOTHORACIC VASCULAR SURGERY)

## 2023-02-23 PROCEDURE — 99205 OFFICE O/P NEW HI 60 MIN: CPT | Performed by: THORACIC SURGERY (CARDIOTHORACIC VASCULAR SURGERY)

## 2023-02-23 PROCEDURE — 3017F COLORECTAL CA SCREEN DOC REV: CPT | Performed by: THORACIC SURGERY (CARDIOTHORACIC VASCULAR SURGERY)

## 2023-02-23 PROCEDURE — 1123F ACP DISCUSS/DSCN MKR DOCD: CPT | Performed by: THORACIC SURGERY (CARDIOTHORACIC VASCULAR SURGERY)

## 2023-02-23 PROCEDURE — 1101F PT FALLS ASSESS-DOCD LE1/YR: CPT | Performed by: THORACIC SURGERY (CARDIOTHORACIC VASCULAR SURGERY)

## 2023-02-23 PROCEDURE — G8417 CALC BMI ABV UP PARAM F/U: HCPCS | Performed by: THORACIC SURGERY (CARDIOTHORACIC VASCULAR SURGERY)

## 2023-02-23 PROCEDURE — 99215 OFFICE O/P EST HI 40 MIN: CPT | Performed by: INTERNAL MEDICINE

## 2023-02-23 PROCEDURE — 1123F ACP DISCUSS/DSCN MKR DOCD: CPT | Performed by: INTERNAL MEDICINE

## 2023-02-23 PROCEDURE — G8536 NO DOC ELDER MAL SCRN: HCPCS | Performed by: INTERNAL MEDICINE

## 2023-02-23 PROCEDURE — G8417 CALC BMI ABV UP PARAM F/U: HCPCS | Performed by: INTERNAL MEDICINE

## 2023-02-23 PROCEDURE — G8427 DOCREV CUR MEDS BY ELIG CLIN: HCPCS | Performed by: THORACIC SURGERY (CARDIOTHORACIC VASCULAR SURGERY)

## 2023-02-23 PROCEDURE — G8536 NO DOC ELDER MAL SCRN: HCPCS | Performed by: THORACIC SURGERY (CARDIOTHORACIC VASCULAR SURGERY)

## 2023-02-23 NOTE — LETTER
2/28/2023    Patient: Cyndie Payan   YOB: 1950   Date of Visit: 2/23/2023     Deep Lamar MD  [address] [place] [phone]  Via Fax: [phone]    Dear Deep Lamar MD,      Thank you for referring Mr. Cyndie Payan to [address] [facility] for evaluation. My notes for this consultation are attached. If you have questions, please do not hesitate to call me. I look forward to following your patient along with you.       Sincerely,    Jewels Cullen MD

## 2023-02-23 NOTE — LETTER
2/24/2023    Patient: Cat Aguirre   YOB: 1950   Date of Visit: 2/23/2023     Mallika Sánchez MD  [address] [place] [phone]  Via Fax: [phone]    Dear Mallika Sánchez MD,      Thank you for referring Mr. Cat Aguirre to [address] [facility] for evaluation. My notes for this consultation are attached. If you have questions, please do not hesitate to call me. I look forward to following your patient along with you.       Sincerely,    Omaira Lynn MD

## 2023-02-23 NOTE — PROGRESS NOTES
I agree with this consultation note. I personally interviewed and examined the patient, and reviewed their diagnostic studies in detail. I have also discussed the case in detail with the referring provider. He has severe aortic stenosis and what appears to be significant but not critical disease of the mid LAD. My recommendation to the patient was that he would be a good candidate for open surgery where he could receive a replacement of his aortic valve and a LIMA to the LAD. I believe that he is a suitable candidate for this operation and that this would provide him the best and most durable long-term result with respect to both his valvular and coronary disease. The patient was more inclined to proceed with a minimally invasive option and was reluctant to have an open operation. Dr. Angel Philippe and I discussed this case in detail with each other and with the patient. We believe that it would be reasonable for the patient to proceed with TAVR and to assess him thereafter with regards to any subsequent need for coronary intervention. Don Brown MD, PhD, [facility]. Patient: Zander Cohn   Age: 74 y.o. Patient Care Team:  Melton Phalen, MD as PCP - General (Family Medicine)  Melton Phalen, MD as PCP - [facility] Provider  Jethro Finnegan MD (Cardiovascular Disease Physician)  Melina Muñoz MD (Cardiothoracic Surgery)    PCP: Melton Phalen, MD    Cardiologist: Dr. Angel Philippe    Diagnosis/Reason for Consultation: The encounter diagnosis was Nonrheumatic aortic valve stenosis. Problem List:   Patient Active Problem List   Diagnosis Code    Chest pain R07.9    Nonrheumatic aortic valve stenosis I35.0         HPI: 74 y.o.male with PMHx of Aortic Stenosis, CAD s/p PCI with NSTEMI in December 2022, HTN, HLD, DM, Obesity, that is referred to the [address] [place] [address] by Dr. Angel Philippe for interventional evaluation of  Aortic Stenosis.  He presents today with his wife who also gives information. The patient presented to the hospital in December 2022 with CP and found to have a NSTEMI. He underwent PCI at that time to RCA and OM1. He also had LAD disease but this was not the culprit lesion and this was left alone to be treated with medical management for the time. Since his procedure, he is feeling better. His CP has completely resolved. Only significant exertion causes dyspnea. He continues to have right knee pain. He had a TKR in 2019 but it continues to bother him. He is able to walk for 8 minutes or more without stopping at Cardiac Rehab. He also has left hip pain and these joint issues limit his activity prior to him becoming dyspneic. He does feel fatigued sometimes and occasionally takes naps. He did have some medications following his stent. He denies palpitations, CP, dizziness, syncope, fall, orthopnea, PND, LE edema, GIB, or HF hospitalization in the last year. 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 His wife can help him following a surgery. He does not smoke, drink, or use recreational drugs. He is completely independent in his ADLs. SOB/RODRIGUEZ: Yes with exertion:    Fatigue: Yes   Palpitations: No:    Chest pain: No:    Chest tightness with activity: No:    Lightheadedness: No:    Syncope: No:    Falls: No:    Orthopnea: No:    PND: No:    LE edema: No:    Medication changes in past 3 months: Yes Had 3 new medications following stent  Blood/Blackness/Tarriness of stools: No:    Heart Failure Admission w/in past year:  No:    Heart Failure Admission w/in past 2 weeks: No:     NYHA Classification: IIB   Class I (Mild): No limitation of physical activity. Ordinary physical activity does not cause undue fatigue, palpitation, or dyspnea. Class II (Mild): Slight limitation of physical activity. Comfortable at rest, but ordinary physical activity results in fatigue, palpitation, or dyspnea.    Class III (Moderate): Marked limitation of physical activity. Comfortable at rest, but less than ordinary activity causes fatigue, palpitation, or dyspnea   Class IV (Severe): Unable to carry out any physical activity without discomfort. Symptoms  of cardiac insufficiency at rest.  If any physical activity is undertaken, discomfort is increased. Angina Classification: 0   Class 0: No symptoms   Class 1: Angina with strenuous exercise   Class 2: Angina with moderate exercise   Class 3: Angina with mild exertion   Walking 1-2 level blocks at normal pace; Climbing 1 flight of stairs at normal pace  Class 4: Angina at any level of physical exertion       Past Medical History:   Diagnosis Date    Diabetes ([address]     Hepatitis 1959    not active    Hypertension     Kidney stones     Malaria 1969    while serving in [employer]     Endocarditis: No:    Pulmonary HTN:  No:  Greater than 2/3 systemic: No:   Immunocompromised/Steroids: No:   Liver Dz/Cirrhosis: No:    If yes, MELD score:  n/a  Last Dental Visit:  3 months ago   Any dental pain/concerns: Needs a tooth pulled    Past Surgical History:   Procedure Laterality Date    HX KNEE REPLACEMENT Right 03/05/2019    Dr. Zakia Reddy      Social History     Tobacco Use    Smoking status: Never    Smokeless tobacco: Never   Substance Use Topics    Alcohol use: Never      Family History   Problem Relation Age of Onset    Diabetes Father      Prior to Admission medications    Medication Sig Start Date End Date Taking? Authorizing Provider   furosemide (LASIX) 40 mg tablet Take 1 Tablet by mouth daily. 12/28/22  Yes Annika Heaton MD   clopidogreL (PLAVIX) 75 mg tab Take 1 Tablet by mouth daily. 12/15/22  Yes Honey Foote MD   metoprolol succinate (TOPROL-XL) 25 mg XL tablet Take 1 Tablet by mouth daily. 12/15/22  Yes Honey Foote MD   rosuvastatin (CRESTOR) 5 mg tablet Take 1 Tablet by mouth nightly.  12/14/22  Yes Honey Foote MD   metFORMIN (GLUCOPHAGE) 500 mg tablet Take 2,000 mg by mouth two (2) times daily (with meals). TAKING 2 500MG TABLETS N THE AM & 2 500MG TABLETS IN THE PM   Yes Other, MD Cristal   ezetimibe (ZETIA) 10 mg tablet Take  by mouth. Yes Other, MD Cristal   tamsulosin (FLOMAX) 0.4 mg capsule Take 0.4 mg by mouth daily. Yes Other, MD Cristal   amLODIPine (NORVASC) 5 mg tablet Take 5 mg by mouth daily. Yes Other, MD Cristal   glipiZIDE SR (GLUCOTROL XL) 5 mg CR tablet Take  by mouth daily. Yes Other, MD Cristal   aspirin 81 mg chewable tablet Take 81 mg by mouth daily. Yes Other, MD Cristal   cyanocobalamin (VITAMIN B12) 1,000 mcg/mL injection 1,000 mcg by IntraMUSCular route once. Patient not taking: Reported on 2/23/2023    Other, MD Cristal       Allergies   Allergen Reactions    Simvastatin Other (comments)       Current Medications:   Current Outpatient Medications   Medication Sig Dispense Refill    furosemide (LASIX) 40 mg tablet Take 1 Tablet by mouth daily. 90 Tablet 3    clopidogreL (PLAVIX) 75 mg tab Take 1 Tablet by mouth daily. 30 Tablet 2    metoprolol succinate (TOPROL-XL) 25 mg XL tablet Take 1 Tablet by mouth daily. 30 Tablet 2    rosuvastatin (CRESTOR) 5 mg tablet Take 1 Tablet by mouth nightly. 30 Tablet 2    metFORMIN (GLUCOPHAGE) 500 mg tablet Take 2,000 mg by mouth two (2) times daily (with meals). TAKING 2 500MG TABLETS N THE AM & 2 500MG TABLETS IN THE PM      ezetimibe (ZETIA) 10 mg tablet Take  by mouth. tamsulosin (FLOMAX) 0.4 mg capsule Take 0.4 mg by mouth daily. amLODIPine (NORVASC) 5 mg tablet Take 5 mg by mouth daily. glipiZIDE SR (GLUCOTROL XL) 5 mg CR tablet Take  by mouth daily. aspirin 81 mg chewable tablet Take 81 mg by mouth daily. cyanocobalamin (VITAMIN B12) 1,000 mcg/mL injection 1,000 mcg by IntraMUSCular route once. (Patient not taking: Reported on 2/23/2023)         Vitals: Blood pressure (!) 141/69, pulse 86, temperature 98.1 °F (36.7 °C), temperature source Oral, height 6' (1.829 m), weight 237 lb (107.5 kg), SpO2 97 %. Allergies: is allergic to simvastatin.     Review of Systems: Pertinent Positives per HPI   [] Unable to obtain  ROS due to  []mental status change  []sedated   []intubated   [x]Total of 13 systems reviewed as follows:  Constitutional: Negative fever, negative chills, +fatigue  Eyes:   Negative for amauroses fugax  ENT:   Negative sore throat,oral abscess   Endocrine Negative for thyroid replacement Rx; goiter; +DM  Respiratory:  Negative chronic cough,sputum production, +dyspnea on exertion  Cards:   Negative for palpitations, varicosities, claudication, lower extremity edema  GI:   Negative for dysphagia, bleeding, nausea, vomiting, diarrhea, and abdominal pain  Genitourinary: Negative for frequency, dysuria, BPH   Integument:  Negative for rash and pruritus  Hematologic:  Negative for easy bruising; bleeding dyscrasia   Musculoskel: Negative for muscle weakness inhibiting ambulation  Neurological:  Negative for stroke, TIA, syncope, dizziness  Behavl/Psych: Negative for feelings of anxiety, depression     Cardiovascular Testing:     EKG:   Component Ref Range & Units 2 mo ago   (12/13/22) 2 mo ago   (12/10/22)   Ventricular Rate BPM 66  85    Atrial Rate BPM 66  87    P-R Interval ms 158     QRS Duration ms 96  94    Q-T Interval ms 428  370    QTC Calculation (Bezet) ms 448  440    Calculated P Axis degrees 46     Calculated R Axis degrees 23  22    Calculated T Axis degrees 63  28    Diagnosis  Sinus rhythm with premature atrial complexes   Nonspecific T wave abnormality   Abnormal ECG   When compared with ECG of 12-DEC-2022 11:26,   MANUAL COMPARISON REQUIRED, DATA IS UNCONFIRMED   Confirmed by Telma Horta ([id number]) on 12/13/2022 3:02:12 PM  Probably Normal sinus rhythm   Possible Inferior infarct , age undetermined   Abnormal ECG   When compared with ECG of 10-DEC-2022 14:51,   Non-specific ST & T wave changes   Confirmed by Maria G Snowden MD. ([id number]) on 12/12/2022 3:29:07 AM          TTE: 12/13/22  Interpretation Summary         Left Ventricle: Normal left ventricular systolic function with a visually estimated EF of 55 - 60%. Left ventricle size is normal. Normal wall thickness. Normal wall motion. Aortic Valve: regurgitation. Severe stenosis of the aortic valve. Mean gradient of 44 mmhg. Aorta: Mildly dilated aortic root at 4 cm. Echo Findings    Left Ventricle Normal left ventricular systolic function with a visually estimated EF of 55 - 60%. Left ventricle size is normal. Normal wall thickness. Normal wall motion. Normal diastolic function. Left Atrium Left atrium is mildly dilated. Right Ventricle Right ventricle size is normal. Normal wall thickness. Normal systolic function. Right Atrium Right atrium size is normal.   Aortic Valve Severe sclerosis of the aortic valve cusp. Mild regurgitation. Severe stenosis of the aortic valve. Mean gradient of 44 mmhg. Mitral Valve Valve structure is normal. Trace regurgitation. No stenosis noted. Tricuspid Valve Valve structure is normal. Trace regurgitation. No stenosis noted. Pulmonic Valve Valve structure is normal. No regurgitation. No stenosis noted. Aorta Mildly dilated aortic root. Pericardium No pericardial effusion.    Aortic Valve Measurements    Regurgitation/PISA   AR Max Velocity PISA 4.2 m/s          AR  millisecond           Stenosis   AV Mean Gradient 44 mmHg          AV Peak Gradient 74 mmHg          AV Peak Velocity 4.3 m/s          AV Velocity Ratio 0.3          AV .1 cm          LVOT:AV VTI Index 0.29          AV Mean Velocity 3.2 m/s          AV Area by VTI 1 cm2          MIMI/BSA VTI 0.4 cm2/m2          AV Area by Peak Velocity 1 cm2          MIMI/BSA Peak Velocity 0.4 cm2/m2           LVOT   LVOT Diameter 2.1 cm          LVOT Area 3.5 cm2          LVOT Mean Gradient 4 mmHg          LVOT Peak Gradient 6 mmHg          LVOT VTI 34.1 cm          LVOT Peak Velocity           Cardiac catheterization: 12/10/22    CARDIAC PROCEDURE 12/13/2022 12/13/2022    Conclusion  Findings:  1) Severe aortic stenosis with mean gradient of about 50 mm Hg  2)Severe native 2 vessel CAD. Culprit distal RCA 99% stenosis as the cause of NSTEMI/USA  3)Selective bilateral iliac angiogram was performed by advancing the angles glide catheter through the aorta to first selectively engage left and then right common iliac artery which was visualized with DSA imaging. No significant disease was noted in bilateral NANCY< EIA and CFA. Bilateral IIA were patent and CFA was adequately sized for TF TAVR. 4)PCI of distal RCA with 3.5 by 15 mm Vijay DALIA  5)PCI of OM1 with 3.5 by 18 mm Vijay DALIA  6)Intermediate diffuse disease in LAD to be managed medially at this time. Access: right radial no issues  Contrast 90 cc    Recommendation  1)Plavix based DAPT  2)GDMT for CAD  3)Follow up for aortic valve intervention    Signed by: Jennifer Starks MD on 12/13/2022  4:00 PM        Carotid Dopplers: None    PFTs: FEV1/Predicted:  None  Normal FEV1 > 1 Liter, Predicted = 100%, DLCO > 80%    Mild Lung Disease (60-70% Predicted)    Moderate Lung Disease (50-55% Predicted)    Severe Lung Disease (< 50% Predicted or PO2 < 60 or pCO2>50 on RA)    Gated C/A/P CTA: Completed 2/16/23    Physical Exam:  General: Well nourished well groomed male appearing stated age accompanied by his wife  Neuro: A&OX3. OCAMPO. PERRL. Steady cane assisted gait  Head:Normocephalic. Atraumatic. Symmetrical  Neck: Trachea Midline  Resp: CTA B. No Adv BS/cough/sputum/tachypnea with seated conversation  CV: S1S2 RRR. MANSI IV/VI. No JVD/carotid bruits. Pink/warm/dry extremities. No LE peripheral edema  GI:Benign ab. Soft. NT/ND. Active BS  : Voids  Integ: No obvious s/s of infection or breakdown  Musculo/Skeletal: FROM in all major joints.  Good muscle tone    Clinic Evaluation:   KCCQ-12: scanned into EMR    5 meter gait: 3.61 seconds    Frality Survey:  Alben Clarence Index ADL - 6/6  scanned into EMR STS 4.2 Risk Score / Predicted 30 day mortality: - calculations scanned into EMR  1.669%/10.675%    Assessment/Plan: Aortic Stenosis - severe and symptomatic:  Would like to proceed with TAVR but wait until May. Low surgical risk. ASA, Plavix following TAVR  CAD s/p NSTEMI and stent X 2 in December 2022: Residual LAD disease and will continue to treat this medically for now as he is not symptomatic. ASA, Plavix, BB, Statin  HTN: Lasix, Toprol XL, Amlodipine  HLD: Statin, Zetia  DM: Metformin, Glipizide  Obesity: Dietary restriction and increased activity as able. The patient was evaluated by Rose Holman and Akhil who discussed conventional aortic valve replacement and TAVR, as well as the risks and benefits of both versus continuing medical therapy with the patient and his wife. All questions were answered. Rose Holman and Akhil felt that TAVR is a better option for this patient, given age, frailty, and co-morbidities. With all the options available, the patient has agreed to proceed with TAVR for the treatment of his aortic stenosis and will be scheduled.

## 2023-02-24 PROBLEM — I35.0 NONRHEUMATIC AORTIC VALVE STENOSIS: Status: ACTIVE | Noted: 2023-02-24

## 2023-02-28 ENCOUNTER — HOSPITAL ENCOUNTER (OUTPATIENT)
Dept: CARDIAC REHAB | Age: 73
Discharge: HOME | End: 2023-02-28
Payer: MEDICARE

## 2023-02-28 VITALS — BODY MASS INDEX: 32.14 KG/M2 | WEIGHT: 237 LBS

## 2023-02-28 PROCEDURE — 93798 PHYS/QHP OP CAR RHAB W/ECG: CPT

## 2023-02-28 NOTE — PROGRESS NOTES
Dr. Teresita Figueroa. [phone]            Cardiology valvular heart disease consult/Progress Note      Requesting/referring provider: Zahira Wing MD, Dr. Ariane Alfred    Reason for Consult: Aortic stenosis    Assessment/Plan:  1. Symptomatic severe aortic stenosis with NYHA class II symptoms  2. Severe coronary artery disease with recent non-STEMI, s/p PCI to RCA and OM1, moderate disease in LAD  3. Hypertension-  4. Hyperlipidemia  5. Diabetes mellitus  6. CKD stage IIIa secondary to diabetes    Ms. Renee Johnston is seen at the request of Dr. Ariane Alfred.  He has reported improvement in his symptoms of chest pain following PCI to RCA and OM. However shortness of breath which has been there for the past 6 to 8 months continues to bother him and is likely secondary to symptomatic severe aortic stenosis. He does have moderate to severe diffuse disease in the distal LAD. However no further angina is reported. He would benefit from definitive intervention on his aortic valve. We will order a CTA for potential TAVR planning. He has some physical limitation because of right knee replacement and probably would be better suited for TAVR compared to surgical AVR unless valve anatomy appears prohibitive. We had a discussion with him regarding management of his LAD disease in conjunction with aortic valve disease. Options would be to perform TAVR and defer LAD disease for medical management versus considering open heart surgery with coronary artery bypass grafting and surgical aortic valve replacement. Me and  discussed both options with the patient related to risks and benefits. Patient has strong inclination not to consider open heart surgery and would prefer transcatheter aortic valve replacement if feasible. I informed the patient that in the absence of any definitive therapy severe symptomatic aortic stenosis confers a 50% 2 to 5-year mortality.   I specifically discussed TAVR related procedural risks such as vascular complication, risk of stroke, risk of pacemaker need post TAVR, risk of conversion to open surgery, death from TAVR procedure. I also discussed the expected benefits include improvement in functional status, reduction in angina, improvement in exertional tolerance from the procedure. Finally I reviewed expected length of stay in the recovery with the patient based on if the perioperative course is complicated versus uncomplicated. Patient and family voiced understanding and are willing to proceed with further course of action. His CT chest abdomen pelvis was personally reviewed and shows suitable anatomy for transfemoral TAVR. His valve anatomy is suitable for transcatheter aortic valve placement as well. Annulus measurements indicate suitability for 26 mm S3 prosthesis. Switch simvastatin to rosuvastatin. Plavix based dual antiplatelet therapy to continue. If he is stable it is reasonable to wait for 2 to 3 months prior to undergoing TAVR. Patient did express wishes to wait until May before he wants to proceed. Investigations ordered:     []    High complexity decision making was performed  []    Patient is at high-risk of decompensation with multiple organ involvement  []    Complex/difficult social determinants of health outcomes  Total of **minutes were spent on reviewing the records, analyzing investigations and documentation in the chart, on the day of visit including time for examination and time spent with the patient  Investigations personally reviewed and interpreted  Cardiac catheterization from December was personally reviewed. It showed evidence of 90-99% lesion in distal RCA which was treated with a drug-eluting stent. He also had 75% focal stenosis in principal marginal branch of circumflex which was treated with a drug-eluting stent. Mid to distal LAD had diffuse long segment 60% stenosis which was deferred. Simultaneous pressure measurement across the aortic valve indicated a mean transaortic gradient of about 52 mmHg with calculated aortic valve area of 0.8 cm. ECG December 2022: Reviewed: Sinus rhythm, normal QRS duration, normal MO duration, nonspecific ST-T changes. Echocardiogram December 2022: Normal LV function, peak transaortic velocity of about 4.2 m/s with mean transaortic gradient of 43 mmHg. Accompanying mild aortic insufficiency no other critical valvular abnormalities. Investigations reviewed    HPI: Bran Lechuga, a 74y.o. year-old who is seen for evaluation of chest pain and management of aortic stenosis. Ms. Taina Roca is seen at the request of Dr. Vikas Lama.  She has history of coronary disease which manifested in the form of non-STEMI and recently underwent PCI to her RCA and OM1 with moderate diffuse disease in LAD. She also has severe aortic stenosis. Since undergoing PCI she has reported improvement in functional tolerance with no subsequent anginal chest discomfort except for occasional twinges of atypical chest pain. She does not report of any orthopnea or PND. No recent syncope or dizziness is reported. He does not report of any further angina. Mild shortness of breath is reported but overall he is doing quite well  He  has a past medical history of Diabetes ([address] Hepatitis (1959), Hypertension, Kidney stones, and Malaria (1969). Review of system:Patient reports no dyspnea/PND/Orthpnea/CP. She reports no cough/fever/focal neurological deficits/abdominal pain. All other systems negative except as above. Family History   Problem Relation Age of Onset    Diabetes Father       Social History     Socioeconomic History    Marital status:    Tobacco Use    Smoking status: Never    Smokeless tobacco: Never   Substance and Sexual Activity    Alcohol use: Never    Drug use: Never      PE  There were no vitals filed for this visit.    There is no height or weight on file to calculate BMI.    General:    Alert, cooperative, no distress. Psychiatric:    Normal Mood and affect    Eye/ENT:      Pupils equal, No asymmetry, Conjunctival pink. Able to hear voice at normal amplitude   Lungs:      Visibly symmetric chest expansion, No palpable tenderness. Clear to auscultation bilaterally. Heart[de-identified]    Regular rate and rhythm, S1, S2 normal, mid peaking grade 3/6 midsystolic murmur best heard in right upper sternal border. , click, rub or gallop. No JVD, Normal palpable peripheral pulses. No cyanosis   Abdomen:     Soft, non-tender. Bowel sounds normal. No masses,  No      organomegaly. Extremities:   Extremities normal, atraumatic, no edema. Neurologic:   CN II-XII grossly intact.  No gross focal deficits           Recent Labs:  No results found for: CHOL, CHOLX, CHLST, CHOLV, 839696, HDL, HDLP, LDL, LDLC, DLDLP, TGLX, TRIGL, TRIGP, CHHD, CHHDX  Lab Results   Component Value Date/Time    Creatinine 1.39 (H) 12/12/2022 12:14 AM     Lab Results   Component Value Date/Time    BUN 28 (H) 12/12/2022 12:14 AM     Lab Results   Component Value Date/Time    Potassium 4.4 12/12/2022 12:14 AM     Lab Results   Component Value Date/Time    Hemoglobin A1c 6.0 (H) 12/11/2022 01:05 AM     Lab Results   Component Value Date/Time    HGB 11.3 (L) 12/10/2022 02:53 PM     Lab Results   Component Value Date/Time    PLATELET 395 90/86/1138 02:53 PM       Reviewed:  Past Medical History:   Diagnosis Date    Diabetes ([facility] [address]     Hepatitis 1959    not active    Hypertension     Kidney stones     Malaria 1969    while serving in [employer] History     Tobacco Use   Smoking Status Never   Smokeless Tobacco Never     Social History     Substance and Sexual Activity   Alcohol Use Never     Allergies   Allergen Reactions    Simvastatin Other (comments)     Family History   Problem Relation Age of Onset    Diabetes Father         Current Outpatient Medications   Medication Sig    furosemide (LASIX) 40 mg tablet Take 1 Tablet by mouth daily. clopidogreL (PLAVIX) 75 mg tab Take 1 Tablet by mouth daily. metoprolol succinate (TOPROL-XL) 25 mg XL tablet Take 1 Tablet by mouth daily. rosuvastatin (CRESTOR) 5 mg tablet Take 1 Tablet by mouth nightly. metFORMIN (GLUCOPHAGE) 500 mg tablet Take 2,000 mg by mouth two (2) times daily (with meals). TAKING 2 500MG TABLETS N THE AM & 2 500MG TABLETS IN THE PM    ezetimibe (ZETIA) 10 mg tablet Take  by mouth. tamsulosin (FLOMAX) 0.4 mg capsule Take 0.4 mg by mouth daily. amLODIPine (NORVASC) 5 mg tablet Take 5 mg by mouth daily. glipiZIDE SR (GLUCOTROL XL) 5 mg CR tablet Take  by mouth daily. aspirin 81 mg chewable tablet Take 81 mg by mouth daily. cyanocobalamin (VITAMIN B12) 1,000 mcg/mL injection 1,000 mcg by IntraMUSCular route once. (Patient not taking: Reported on 2/23/2023)     No current facility-administered medications for this visit. ATTENTION:   This medical record was transcribed using an electronic medical records/speech recognition system. Although proofread, it may and can contain electronic, spelling and other errors. Corrections may be executed at a later time. Please feel free to contact us for any clarifications as needed.     [facility] [place]  [place], [place]. [phone]

## 2023-03-02 ENCOUNTER — HOSPITAL ENCOUNTER (OUTPATIENT)
Dept: CARDIAC REHAB | Age: 73
Discharge: HOME | End: 2023-03-02
Payer: MEDICARE

## 2023-03-02 VITALS — BODY MASS INDEX: 31.87 KG/M2 | WEIGHT: 235 LBS

## 2023-03-02 PROCEDURE — 93798 PHYS/QHP OP CAR RHAB W/ECG: CPT

## 2023-03-09 ENCOUNTER — HOSPITAL ENCOUNTER (OUTPATIENT)
Dept: CARDIAC REHAB | Age: 73
Discharge: HOME | End: 2023-03-09
Payer: MEDICARE

## 2023-03-09 VITALS — BODY MASS INDEX: 32.01 KG/M2 | WEIGHT: 236 LBS

## 2023-03-09 PROCEDURE — 93798 PHYS/QHP OP CAR RHAB W/ECG: CPT

## 2023-03-14 ENCOUNTER — HOSPITAL ENCOUNTER (OUTPATIENT)
Dept: CARDIAC REHAB | Age: 73
Discharge: HOME | End: 2023-03-14
Payer: MEDICARE

## 2023-03-14 VITALS — WEIGHT: 236 LBS | BODY MASS INDEX: 32.01 KG/M2

## 2023-03-14 PROCEDURE — 93798 PHYS/QHP OP CAR RHAB W/ECG: CPT

## 2023-03-15 RX ORDER — ROSUVASTATIN CALCIUM 5 MG/1
TABLET, COATED ORAL
Qty: 90 TABLET | Refills: 0 | Status: SHIPPED | OUTPATIENT
Start: 2023-03-15

## 2023-03-15 RX ORDER — CLOPIDOGREL BISULFATE 75 MG/1
75 TABLET ORAL DAILY
Qty: 90 TABLET | Refills: 1 | Status: SHIPPED | OUTPATIENT
Start: 2023-03-15

## 2023-03-15 RX ORDER — METOPROLOL SUCCINATE 25 MG/1
25 TABLET, EXTENDED RELEASE ORAL DAILY
Qty: 90 TABLET | Refills: 1 | Status: SHIPPED | OUTPATIENT
Start: 2023-03-15

## 2023-03-15 NOTE — TELEPHONE ENCOUNTER
Requested Prescriptions     Signed Prescriptions Disp Refills    clopidogreL (PLAVIX) 75 mg tab 90 Tablet 1     Sig: TAKE 1 TABLET BY MOUTH DAILY     Authorizing Provider: Cony Berry     Ordering User: ELODIA SNIDER    rosuvastatin (CRESTOR) 5 mg tablet 90 Tablet 0     Sig: TAKE 1 TABLET BY MOUTH EVERY EVENING     Authorizing Provider: Cony Berry     Ordering User: ELODIA SNIDER    metoprolol succinate (TOPROL-XL) 25 mg XL tablet 90 Tablet 1     Sig: TAKE 1 TABLET BY MOUTH DAILY     Authorizing Provider: Cony Berry     Ordering User: Brenda Crow     Verbal order per Dr. Moi Gusman.     Future Appointments   Date Time Provider Wang Villanueva   3/16/2023  1:30 PM JANUSZ Connell 98   3/28/2023 10:20 AM MD KONRAD Singh AMB   4/26/2023  9:30 AM [facility] PSYCHIATRIC [place] PAT EXAM RM 1 [facility]

## 2023-03-16 ENCOUNTER — HOSPITAL ENCOUNTER (OUTPATIENT)
Dept: CARDIAC REHAB | Age: 73
Discharge: HOME | End: 2023-03-16
Payer: MEDICARE

## 2023-03-16 VITALS — WEIGHT: 234 LBS | BODY MASS INDEX: 31.74 KG/M2

## 2023-03-16 PROCEDURE — 93798 PHYS/QHP OP CAR RHAB W/ECG: CPT

## 2023-03-28 ENCOUNTER — HOSPITAL ENCOUNTER (OUTPATIENT)
Dept: CARDIAC REHAB | Age: 73
Discharge: HOME | End: 2023-03-28
Payer: MEDICARE

## 2023-03-28 ENCOUNTER — OFFICE VISIT (OUTPATIENT)
Dept: CARDIOLOGY CLINIC | Age: 73
End: 2023-03-28
Payer: MEDICARE

## 2023-03-28 VITALS
OXYGEN SATURATION: 96 % | DIASTOLIC BLOOD PRESSURE: 62 MMHG | WEIGHT: 235 LBS | SYSTOLIC BLOOD PRESSURE: 132 MMHG | HEART RATE: 85 BPM | BODY MASS INDEX: 31.83 KG/M2 | HEIGHT: 72 IN | RESPIRATION RATE: 18 BRPM

## 2023-03-28 VITALS — BODY MASS INDEX: 32.01 KG/M2 | WEIGHT: 236 LBS

## 2023-03-28 DIAGNOSIS — I25.10 CORONARY ARTERY DISEASE INVOLVING NATIVE CORONARY ARTERY OF NATIVE HEART WITHOUT ANGINA PECTORIS: Primary | ICD-10-CM

## 2023-03-28 DIAGNOSIS — I35.0 SEVERE AORTIC STENOSIS: ICD-10-CM

## 2023-03-28 DIAGNOSIS — I10 BENIGN ESSENTIAL HTN: ICD-10-CM

## 2023-03-28 DIAGNOSIS — E78.2 MIXED HYPERLIPIDEMIA: ICD-10-CM

## 2023-03-28 PROCEDURE — 93798 PHYS/QHP OP CAR RHAB W/ECG: CPT

## 2023-03-28 PROCEDURE — G0463 HOSPITAL OUTPT CLINIC VISIT: HCPCS | Performed by: INTERNAL MEDICINE

## 2023-03-28 RX ORDER — AMOXICILLIN 500 MG/1
CAPSULE ORAL
COMMUNITY
Start: 2023-02-16

## 2023-03-28 NOTE — LETTER
Patient:  Yuki Hein   YOB: 1950  Date of Visit: 3/28/2023      Dear Elbert Russo MD  [address] [id number]  Via Fax: [phone]     Bob Chaves MD  [address] [place] [address]  [address]  [facility] [id number]  Via In [facility] [address]:    Mr. Chung Lahan 73 yo M with CAD and PCI to the RCA and OM 12/2022 with findings of diffuse distal LAD disease, severe aortic stenosis was noted by echocardiogram, essential hypertension, type 2 diabetes, mixed hyperlipidemia. Since hospitalization he has been feeling better. No exertional chest pain. He does feel occasional slight \"spike\" in his chest that last few seconds. He has had some lower extremity edema and he was previously on hydrochlorothiazide. He is compensated on exam clear lungs he does have a IV/VI systolic murmur at the LUSB and 1+ lower extremity edema. 252 12/2022 to 235    Since his last visit, overall he has been doing well. He has been feeling better. No chest pain. His breathing has been stable. No significant palpitations. He has been participating in cardiac rehab. He has lost weight going from 252 to 235 pounds by modifying his diet and staying active. He did see Dr. Be Pike in the Valve Clinic and the plan is for TAVR. There was some discussion on surgical approach, but I do think TAVR is most appropriate and he is going to have this in May. He is compensated on exam, IV/VI systolic murmur LUSB    Assessment and Plan:   1. Coronary artery disease, status post PCI to the RCA and OM with drug eluting stent in 12/2022. Will have him follow back in three months. 2. Severe aortic stenosis. Plans for TAVR in May and I think this is most appropriate. 3. Essential hypertension. Blood pressure is controlled and no changes made. 4. Mixed hyperlipidemia. Tolerating statin. 5. Type 2 diabetes. 6. Lower extremity edema. Consistent with venous insufficiency. This has resolved.       If you have questions, please do not hesitate to call me.       Sincerely,      Danny Colby MD

## 2023-03-28 NOTE — PROGRESS NOTES
[facility] [address]: Ethan Macdonald  [phone]    HPI:   Mr. Dillon Bolds 73 yo M with CAD and PCI to the RCA and OM 12/2022 with findings of diffuse distal LAD disease, severe aortic stenosis was noted by echocardiogram, essential hypertension, type 2 diabetes, mixed hyperlipidemia. Since hospitalization he has been feeling better. No exertional chest pain. He does feel occasional slight \"spike\" in his chest that last few seconds. He has had some lower extremity edema and he was previously on hydrochlorothiazide. He is compensated on exam clear lungs he does have a IV/VI systolic murmur at the LUSB and 1+ lower extremity edema. 252 12/2022 to 235    Since his last visit, overall he has been doing well. He has been feeling better. No chest pain. His breathing has been stable. No significant palpitations. He has been participating in cardiac rehab. He has lost weight going from 252 to 235 pounds by modifying his diet and staying active. He did see Dr. Sada Urban in the Valve Clinic and the plan is for TAVR. There was some discussion on surgical approach, but I do think TAVR is most appropriate and he is going to have this in May. He is compensated on exam, IV/VI systolic murmur LUSB    Assessment and Plan:   1. Coronary artery disease, status post PCI to the RCA and OM with drug eluting stent in 12/2022. Will have him follow back in three months. 2. Severe aortic stenosis. Plans for TAVR in May and I think this is most appropriate. 3. Essential hypertension. Blood pressure is controlled and no changes made. 4. Mixed hyperlipidemia. Tolerating statin. 5. Type 2 diabetes. 6. Lower extremity edema. Consistent with venous insufficiency. This has resolved. He  has a past medical history of Diabetes ([facility] [address] Hepatitis (1959), Hypertension, Kidney stones, and Malaria (1969). All other systems negative except as above.      PE  Vitals:    03/28/23 1000   BP: 132/62   Pulse: 85   Resp: 18   SpO2: 96%   Weight: 235 lb (106.6 kg)   Height: 6' (1.829 m)      Body mass index is 31.87 kg/m².   General appearance - alert, well appearing, and in no distress  Mental status - affect appropriate to mood  Eyes - sclera anicteric, moist mucous membranes  Neck - supple, no JVD  Chest - clear to auscultation, no wheezes, rales or rhonchi  Heart - normal rate, regular rhythm, normal S1, S2, IV/VI systolic murmur  Abdomen - soft, nontender, nondistended, no masses or organomegaly  Back exam - full range of motion, no tenderness  Neurological - no focal deficits  Extremities - peripheral pulses normal, trace LE edema      Recent Labs:  No results found for: CHOL, CHOLX, CHLST, CHOLV, 147594, HDL, HDLP, LDL, LDLC, DLDLP, TGLX, TRIGL, TRIGP, CHHD, CHHDX  Lab Results   Component Value Date/Time    Creatinine 1.39 (H) 12/12/2022 12:14 AM     Lab Results   Component Value Date/Time    BUN 28 (H) 12/12/2022 12:14 AM     Lab Results   Component Value Date/Time    Potassium 4.4 12/12/2022 12:14 AM     Lab Results   Component Value Date/Time    Hemoglobin A1c 6.0 (H) 12/11/2022 01:05 AM     Lab Results   Component Value Date/Time    HGB 11.3 (L) 12/10/2022 02:53 PM     Lab Results   Component Value Date/Time    PLATELET 210 47/26/3009 02:53 PM       Reviewed:  Past Medical History:   Diagnosis Date    Diabetes ([facility] [address]     Hepatitis 1959    not active    Hypertension     Kidney stones     Malaria 1969    while serving in [employer] [facility]     Tobacco Use   Smoking Status Never    Passive exposure: Never   Smokeless Tobacco Never     Social History     Substance and Sexual Activity   Alcohol Use Never     Allergies   Allergen Reactions    Simvastatin Other (comments)       Current Outpatient Medications   Medication Sig    amoxicillin (AMOXIL) 500 mg capsule TAKE 4 CAPSULES BY MOUTH 1 HOUR PRIOR TO DENTAL APPOINTMENT    clopidogreL (PLAVIX) 75 mg tab TAKE 1 TABLET BY MOUTH DAILY    rosuvastatin (CRESTOR) 5 mg tablet TAKE 1 TABLET BY MOUTH EVERY EVENING    metoprolol succinate (TOPROL-XL) 25 mg XL tablet TAKE 1 TABLET BY MOUTH DAILY    furosemide (LASIX) 40 mg tablet Take 1 Tablet by mouth daily. metFORMIN (GLUCOPHAGE) 500 mg tablet Take 2,000 mg by mouth two (2) times daily (with meals). TAKING 2 500MG TABLETS N THE AM & 2 500MG TABLETS IN THE PM    ezetimibe (ZETIA) 10 mg tablet Take  by mouth. tamsulosin (FLOMAX) 0.4 mg capsule Take 0.4 mg by mouth daily. amLODIPine (NORVASC) 5 mg tablet Take 5 mg by mouth daily. glipiZIDE SR (GLUCOTROL XL) 5 mg CR tablet Take  by mouth daily. aspirin 81 mg chewable tablet Take 81 mg by mouth daily. cyanocobalamin (VITAMIN B12) 1,000 mcg/mL injection 1,000 mcg by IntraMUSCular route once. (Patient not taking: No sig reported)     No current facility-administered medications for this visit.        Mk Dickens MD  [facility] [place]  [address] [facility] [address]  [facility], [address]

## 2023-03-30 ENCOUNTER — HOSPITAL ENCOUNTER (OUTPATIENT)
Dept: CARDIAC REHAB | Age: 73
End: 2023-03-30
Payer: MEDICARE

## 2023-03-30 VITALS — BODY MASS INDEX: 32.01 KG/M2 | WEIGHT: 236 LBS

## 2023-03-30 PROCEDURE — 93798 PHYS/QHP OP CAR RHAB W/ECG: CPT

## 2023-04-04 ENCOUNTER — APPOINTMENT (OUTPATIENT)
Dept: CARDIAC REHAB | Age: 73
End: 2023-04-04
Payer: MEDICARE

## 2023-04-13 ENCOUNTER — HOSPITAL ENCOUNTER (OUTPATIENT)
Dept: CARDIAC REHAB | Age: 73
Discharge: HOME | End: 2023-04-13
Payer: MEDICARE

## 2023-04-13 VITALS — BODY MASS INDEX: 32.14 KG/M2 | WEIGHT: 237 LBS

## 2023-04-13 PROCEDURE — 93798 PHYS/QHP OP CAR RHAB W/ECG: CPT

## 2023-04-17 ENCOUNTER — HOSPITAL ENCOUNTER (OUTPATIENT)
Dept: CARDIAC REHAB | Age: 73
Discharge: HOME | End: 2023-04-17
Payer: MEDICARE

## 2023-04-17 VITALS — WEIGHT: 236 LBS | BODY MASS INDEX: 32.01 KG/M2

## 2023-04-17 PROCEDURE — 93798 PHYS/QHP OP CAR RHAB W/ECG: CPT

## 2023-04-20 ENCOUNTER — HOSPITAL ENCOUNTER (OUTPATIENT)
Dept: CARDIAC REHAB | Age: 73
Discharge: HOME | End: 2023-04-20
Payer: MEDICARE

## 2023-04-20 VITALS — WEIGHT: 237 LBS | BODY MASS INDEX: 32.14 KG/M2

## 2023-04-20 PROCEDURE — 93798 PHYS/QHP OP CAR RHAB W/ECG: CPT

## 2023-04-26 ENCOUNTER — HOSPITAL ENCOUNTER (OUTPATIENT)
Dept: GENERAL RADIOLOGY | Age: 73
Discharge: HOME | End: 2023-04-26
Payer: MEDICARE

## 2023-04-26 ENCOUNTER — HOSPITAL ENCOUNTER (OUTPATIENT)
Dept: PREADMISSION TESTING | Age: 73
Discharge: HOME | End: 2023-04-26
Payer: MEDICARE

## 2023-04-26 VITALS
TEMPERATURE: 98 F | WEIGHT: 235.2 LBS | HEART RATE: 79 BPM | SYSTOLIC BLOOD PRESSURE: 160 MMHG | DIASTOLIC BLOOD PRESSURE: 65 MMHG | HEIGHT: 72 IN | OXYGEN SATURATION: 95 % | BODY MASS INDEX: 31.86 KG/M2

## 2023-04-26 DIAGNOSIS — I35.0 NONRHEUMATIC AORTIC VALVE STENOSIS: Primary | ICD-10-CM

## 2023-04-26 DIAGNOSIS — I35.0 NONRHEUMATIC AORTIC VALVE STENOSIS: ICD-10-CM

## 2023-04-26 LAB
ABO + RH BLD: NORMAL
ALBUMIN SERPL-MCNC: 4.2 G/DL (ref 3.5–5)
ALBUMIN/GLOB SERPL: 1.1 (ref 1.1–2.2)
ALP SERPL-CCNC: 92 U/L (ref 45–117)
ALT SERPL-CCNC: 36 U/L (ref 12–78)
ANION GAP SERPL CALC-SCNC: 6 MMOL/L (ref 5–15)
APPEARANCE UR: CLEAR
APTT PPP: 27.1 SEC (ref 22.1–31)
AST SERPL-CCNC: 21 U/L (ref 15–37)
ATRIAL RATE: 74 BPM
BACTERIA URNS QL MICRO: NEGATIVE /HPF
BASOPHILS # BLD: 0.1 K/UL (ref 0–0.1)
BASOPHILS NFR BLD: 1 % (ref 0–1)
BILIRUB SERPL-MCNC: 0.4 MG/DL (ref 0.2–1)
BILIRUB UR QL: NEGATIVE
BLOOD GROUP ANTIBODIES SERPL: NORMAL
BUN SERPL-MCNC: 26 MG/DL (ref 6–20)
BUN/CREAT SERPL: 19 (ref 12–20)
CALCIUM SERPL-MCNC: 9.6 MG/DL (ref 8.5–10.1)
CALCULATED P AXIS, ECG09: 49 DEGREES
CALCULATED R AXIS, ECG10: 11 DEGREES
CALCULATED T AXIS, ECG11: 109 DEGREES
CHLORIDE SERPL-SCNC: 108 MMOL/L (ref 97–108)
CO2 SERPL-SCNC: 26 MMOL/L (ref 21–32)
COLOR UR: ABNORMAL
CREAT SERPL-MCNC: 1.34 MG/DL (ref 0.7–1.3)
DIAGNOSIS, 93000: NORMAL
DIFFERENTIAL METHOD BLD: NORMAL
EOSINOPHIL # BLD: 0.3 K/UL (ref 0–0.4)
EOSINOPHIL NFR BLD: 4 % (ref 0–7)
EPITH CASTS URNS QL MICRO: ABNORMAL /LPF
ERYTHROCYTE [DISTWIDTH] IN BLOOD BY AUTOMATED COUNT: 13.1 % (ref 11.5–14.5)
EST. AVERAGE GLUCOSE BLD GHB EST-MCNC: 143 MG/DL
GLOBULIN SER CALC-MCNC: 3.7 G/DL (ref 2–4)
GLUCOSE SERPL-MCNC: 153 MG/DL (ref 65–100)
GLUCOSE UR STRIP.AUTO-MCNC: NEGATIVE MG/DL
HBA1C MFR BLD: 6.6 % (ref 4–5.6)
HCT VFR BLD AUTO: 38.7 % (ref 36.6–50.3)
HGB BLD-MCNC: 12.6 G/DL (ref 12.1–17)
HGB UR QL STRIP: NEGATIVE
HISTORY CHECKED?,CKHIST: NORMAL
HYALINE CASTS URNS QL MICRO: ABNORMAL /LPF (ref 0–5)
IMM GRANULOCYTES # BLD AUTO: 0 K/UL (ref 0–0.04)
IMM GRANULOCYTES NFR BLD AUTO: 0 % (ref 0–0.5)
INR PPP: 1 (ref 0.9–1.1)
KETONES UR QL STRIP.AUTO: NEGATIVE MG/DL
LEUKOCYTE ESTERASE UR QL STRIP.AUTO: NEGATIVE
LYMPHOCYTES # BLD: 1.7 K/UL (ref 0.8–3.5)
LYMPHOCYTES NFR BLD: 23 % (ref 12–49)
MAGNESIUM SERPL-MCNC: 2.3 MG/DL (ref 1.6–2.4)
MCH RBC QN AUTO: 30.2 PG (ref 26–34)
MCHC RBC AUTO-ENTMCNC: 32.6 G/DL (ref 30–36.5)
MCV RBC AUTO: 92.8 FL (ref 80–99)
MONOCYTES # BLD: 0.6 K/UL (ref 0–1)
MONOCYTES NFR BLD: 8 % (ref 5–13)
NEUTS SEG # BLD: 4.7 K/UL (ref 1.8–8)
NEUTS SEG NFR BLD: 64 % (ref 32–75)
NITRITE UR QL STRIP.AUTO: NEGATIVE
NRBC # BLD: 0 K/UL (ref 0–0.01)
NRBC BLD-RTO: 0 PER 100 WBC
P-R INTERVAL, ECG05: 146 MS
PH UR STRIP: 5 (ref 5–8)
PLATELET # BLD AUTO: 285 K/UL (ref 150–400)
PMV BLD AUTO: 11 FL (ref 8.9–12.9)
POTASSIUM SERPL-SCNC: 4.3 MMOL/L (ref 3.5–5.1)
PROT SERPL-MCNC: 7.9 G/DL (ref 6.4–8.2)
PROT UR STRIP-MCNC: 30 MG/DL
PROTHROMBIN TIME: 10.2 SEC (ref 9–11.1)
Q-T INTERVAL, ECG07: 388 MS
QRS DURATION, ECG06: 98 MS
QTC CALCULATION (BEZET), ECG08: 430 MS
RBC # BLD AUTO: 4.17 M/UL (ref 4.1–5.7)
RBC #/AREA URNS HPF: ABNORMAL /HPF (ref 0–5)
SODIUM SERPL-SCNC: 140 MMOL/L (ref 136–145)
SP GR UR REFRACTOMETRY: 1.02 (ref 1–1.03)
SPECIMEN EXP DATE BLD: NORMAL
THERAPEUTIC RANGE,PTTT: NORMAL SECS (ref 58–77)
TSH SERPL DL<=0.05 MIU/L-ACNC: 0.43 UIU/ML (ref 0.36–3.74)
UA: UC IF INDICATED,UAUC: ABNORMAL
UROBILINOGEN UR QL STRIP.AUTO: 0.2 EU/DL (ref 0.2–1)
VENTRICULAR RATE, ECG03: 74 BPM
WBC # BLD AUTO: 7.3 K/UL (ref 4.1–11.1)
WBC URNS QL MICRO: ABNORMAL /HPF (ref 0–4)

## 2023-04-26 PROCEDURE — 86900 BLOOD TYPING SEROLOGIC ABO: CPT

## 2023-04-26 PROCEDURE — 93005 ELECTROCARDIOGRAM TRACING: CPT

## 2023-04-26 PROCEDURE — 85730 THROMBOPLASTIN TIME PARTIAL: CPT

## 2023-04-26 PROCEDURE — 71046 X-RAY EXAM CHEST 2 VIEWS: CPT

## 2023-04-26 PROCEDURE — 80053 COMPREHEN METABOLIC PANEL: CPT

## 2023-04-26 PROCEDURE — 83036 HEMOGLOBIN GLYCOSYLATED A1C: CPT

## 2023-04-26 PROCEDURE — 84443 ASSAY THYROID STIM HORMONE: CPT

## 2023-04-26 PROCEDURE — 81001 URINALYSIS AUTO W/SCOPE: CPT

## 2023-04-26 PROCEDURE — 85610 PROTHROMBIN TIME: CPT

## 2023-04-26 PROCEDURE — 85025 COMPLETE CBC W/AUTO DIFF WBC: CPT

## 2023-04-26 PROCEDURE — 36415 COLL VENOUS BLD VENIPUNCTURE: CPT

## 2023-04-26 PROCEDURE — 83735 ASSAY OF MAGNESIUM: CPT

## 2023-04-26 NOTE — PERIOP NOTES
[facility] [place]  CARDIAC SURGERY PREOPERATIVE INSTRUCTIONS    Surgery Date:   5/4/23    Your surgeon's office or [facility] staff will call you between 4 PM- 8 PM the day before surgery with your arrival time. If your surgery is on a Monday, you will receive a call the preceding Friday. Please report to [facility] Patient Access/Admitting on the 1st floor. Bring your insurance card, photo identification, and any copayment (if applicable). If you are going home the same day of your surgery, you must have a responsible adult to drive you home. You need to have a responsible adult to stay with you the first 24 hours after surgery and you should not drive a car for 24 hours following your surgery. Nothing to eat or drink after midnight the night before surgery. This includes no water, gum, mints, coffee, juice, etc.    Do NOT drink alcohol or smoke 24 hours before surgery. STOP smoking for 14 days prior as it helps with breathing and healing after surgery. If you are being admitted to the hospital, please leave personal belongings/luggage in your car until you have an assigned hospital room number. Please wear comfortable clothes. Wear your glasses instead of contacts. We ask that all money, jewelry and valuables be left at home. Wear no make up, particularly mascara, the day of surgery. All body piercings, rings, and jewelry need to be removed and left at home. Please remove any nail polish or artificial nails from your fingernails. Please wear your hair loose or down. Please no pony-tails, buns, or any metal hair accessories. When you shower the morning of surgery, please do not apply any lotions or powders afterwards. You may wear deodorant, unless having breast surgery. Do not shave any body area within 24 hours of your surgery. Please follow all instructions to avoid any potential surgical cancellation.   Should your physical condition change, (i.e. fever, cold, flu, etc.) please notify your surgeon as soon as possible. It is important to be on time. If a situation occurs where you may be delayed, please call:  [phone] / [phone] on the day of surgery. The Preadmission Testing staff can be reached at [phone]. Special instructions: NONE    **Your surgeon will instruct you on which medications to continue/hold prior to surgery**      Incentive Spirometer        Using the incentive spirometer helps expand the small air sacs of your lungs, helps you breathe deeply, and helps improve your lung function. Use your incentive spirometer twice a day (10 breaths each time) prior to surgery. How to Use Your Incentive Spirometer:  Hold the incentive spirometer in an upright position. Breathe out as usual.   Place the mouthpiece in your mouth and seal your lips tightly around it. Take a deep breath. Breathe in slowly and as deeply as possible. Keep the blue flow rate guide between the arrows. Hold your breath as long as possible. Then exhale slowly and allow the piston to fall to the bottom of the column. Rest for a few seconds and repeat steps one through five at least 10 times. Preventing Infections Before and After - Your Surgery    IMPORTANT INSTRUCTIONS      You play an important role in your health and preparation for surgery. To reduce the germs on your skin you will need to shower with CHG soap (Chorhexidine gluconate 4%) two times before surgery. CHG soap (Hibiclens, Hex-A-Clens or store brand)  CHG soap will be provided at your Preadmission Testing (PAT) appointment. If you do not have a PAT appointment before surgery, you may arrange to  CHG soap from our office or purchase CHG soap at a pharmacy, grocery or department store. You need to purchase TWO 4 ounce bottles to use for your 2 showers. Steps to follow:  Monique Rasher your hair with your normal shampoo and your body with regular soap and rinse well to remove shampoo and soap from your skin.   Wet a clean washcloth and turn off the shower. Put CHG soap on washcloth and apply to your entire body from the neck down. Do not use on your head, face or private parts(genitals). Do not use CHG soap on open sores, wounds or areas of skin irritation. Wash you body gently for 5 minutes. Do not wash your skin too hard. This soap does not create lather. Pay special attention to your underarms and from your belly button to your feet. Turn the shower back on and rinse well to get CHG soap off your body. Pat your skin dry with a clean, dry towel. Do not apply lotions or moisturizer. Put on clean clothes and sleep on fresh bed sheets and do not allow pets to sleep with you. Shower with CHG soap 2 times before your surgery  The evening before your surgery  The morning of your surgery      Tips to help prevent infections after your surgery:  Protect your surgical wound from germs:  Hand washing is the most important thing you and your caregivers can do to prevent infections. Keep your bandage clean and dry! Do not touch your surgical wound. Use clean, freshly washed towels and washcloths every time you shower; do not share bath linens with others. Until your surgical wound is healed, wear clothing and sleep on bed linens each day that are clean. Do not allow pets to sleep in your bed with you or touch your surgical wound. Do not smoke - smoking delays wound healing. This may be a good time to stop smoking. If you have diabetes, it is important for you to manage your blood sugar levels properly before your surgery as well as after your surgery. Poorly managed blood sugar levels slow down wound healing and prevent you from healing completely. Patient Information Regarding COVID Restrictions    Day of Procedure    Please park in the parking deck or any designated visitor parking lot.   Enter the facility through the Main Entrance of the hospital.  On the day of surgery, please provide the cell phone number of the person who will be waiting for you to the Patient Access representative at the time of registration. Masks are highly recommended in the hospital, but not required. Once your procedure and the immediate recovery period is completed, a nurse in the recovery area will contact your designated visitor to inform them of your room number or to otherwise review other pertinent information regarding your care. Social distancing practices are strongly encouraged in waiting areas and the cafeteria. The patient and spouse were contacted  in person. They verbalized understanding of all instructions and does not  need reinforcement.

## 2023-04-26 NOTE — PERIOP NOTES
Preoperative instructions reviewed with patient. Patient given SSI infection FAQS sheet. Given two bottles of skin prep chlorhexidine soap; given written and verbal instructions on use. Patient was given the opportunity to ask questions on the information provided. PT AND HIS WIFE WERE NOT AWARE THEY HAD TO GO TO ANY ADDITIONAL APPOINTMENTS OR TESTING TODAY AFTER HIS PAT APPT. I S/W THIERRY AT THE SURGEON'S OFFICE AND SHE SAID THE PT DID NOT NEED ADDITIONAL TESTING TODAY BUT HE DID NEED TO GO TO OUTPATIENT REGISTRATION AND THEN TO THE SURGEON'S OFFICE TO MEET WITH THEM. THIS INFORMATION WAS EXPLAINED TO THE PT AND HIS WIFE, THEY VERBALIZED UNDERSTANDING.

## 2023-05-03 ENCOUNTER — TELEPHONE (OUTPATIENT)
Dept: CASE MANAGEMENT | Age: 73
End: 2023-05-03

## 2023-05-03 PROCEDURE — APPSS45 APP SPLIT SHARED TIME 31-45 MINUTES: Performed by: NURSE PRACTITIONER

## 2023-05-04 ENCOUNTER — APPOINTMENT (OUTPATIENT)
Dept: NON INVASIVE DIAGNOSTICS | Age: 73
DRG: 267 | End: 2023-05-04
Attending: THORACIC SURGERY (CARDIOTHORACIC VASCULAR SURGERY)
Payer: MEDICARE

## 2023-05-04 ENCOUNTER — ANESTHESIA (OUTPATIENT)
Dept: CARDIOTHORACIC SURGERY | Age: 73
DRG: 267 | End: 2023-05-04
Payer: MEDICARE

## 2023-05-04 ENCOUNTER — ANESTHESIA EVENT (OUTPATIENT)
Dept: CARDIOTHORACIC SURGERY | Age: 73
DRG: 267 | End: 2023-05-04
Payer: MEDICARE

## 2023-05-04 ENCOUNTER — APPOINTMENT (OUTPATIENT)
Dept: GENERAL RADIOLOGY | Age: 73
DRG: 267 | End: 2023-05-04
Attending: THORACIC SURGERY (CARDIOTHORACIC VASCULAR SURGERY)
Payer: MEDICARE

## 2023-05-04 ENCOUNTER — APPOINTMENT (OUTPATIENT)
Dept: GENERAL RADIOLOGY | Age: 73
DRG: 267 | End: 2023-05-04
Attending: NURSE PRACTITIONER
Payer: MEDICARE

## 2023-05-04 ENCOUNTER — HOSPITAL ENCOUNTER (INPATIENT)
Age: 73
LOS: 1 days | Discharge: HOME | DRG: 267 | End: 2023-05-05
Attending: THORACIC SURGERY (CARDIOTHORACIC VASCULAR SURGERY) | Admitting: THORACIC SURGERY (CARDIOTHORACIC VASCULAR SURGERY)
Payer: MEDICARE

## 2023-05-04 ENCOUNTER — TRANSCRIBE ORDER (OUTPATIENT)
Dept: CARDIAC REHAB | Age: 73
End: 2023-05-04

## 2023-05-04 DIAGNOSIS — Z95.4 S/P AORTIC VALVE ALLOGRAFT: Primary | ICD-10-CM

## 2023-05-04 PROBLEM — I35.0 AORTIC STENOSIS: Status: ACTIVE | Noted: 2023-05-04

## 2023-05-04 LAB
ABO + RH BLD: NORMAL
ALBUMIN SERPL-MCNC: 3.6 G/DL (ref 3.5–5)
ALBUMIN/GLOB SERPL: 1 (ref 1.1–2.2)
ALP SERPL-CCNC: 80 U/L (ref 45–117)
ALT SERPL-CCNC: 27 U/L (ref 12–78)
ANION GAP SERPL CALC-SCNC: 4 MMOL/L (ref 5–15)
APTT PPP: 25.8 SEC (ref 22.1–31)
ARTERIAL PATENCY WRIST A: ABNORMAL
AST SERPL-CCNC: 18 U/L (ref 15–37)
ATRIAL RATE: 71 BPM
BASE DEFICIT BLD-SCNC: 0.5 MMOL/L
BASOPHILS # BLD: 0.1 K/UL (ref 0–0.1)
BASOPHILS NFR BLD: 1 % (ref 0–1)
BDY SITE: ABNORMAL
BILIRUB SERPL-MCNC: 0.4 MG/DL (ref 0.2–1)
BLD PROD TYP BPU: NORMAL
BLOOD GROUP ANTIBODIES SERPL: NORMAL
BNP SERPL-MCNC: 1503 PG/ML
BPU ID: NORMAL
BUN SERPL-MCNC: 31 MG/DL (ref 6–20)
BUN/CREAT SERPL: 21 (ref 12–20)
CALCIUM SERPL-MCNC: 8.6 MG/DL (ref 8.5–10.1)
CALCULATED P AXIS, ECG09: 63 DEGREES
CALCULATED R AXIS, ECG10: 33 DEGREES
CALCULATED T AXIS, ECG11: -122 DEGREES
CHLORIDE SERPL-SCNC: 107 MMOL/L (ref 97–108)
CO2 SERPL-SCNC: 26 MMOL/L (ref 21–32)
CREAT SERPL-MCNC: 1.48 MG/DL (ref 0.7–1.3)
CROSSMATCH RESULT,%XM: NORMAL
DIAGNOSIS, 93000: NORMAL
DIFFERENTIAL METHOD BLD: ABNORMAL
EOSINOPHIL # BLD: 0.2 K/UL (ref 0–0.4)
EOSINOPHIL NFR BLD: 2 % (ref 0–7)
ERYTHROCYTE [DISTWIDTH] IN BLOOD BY AUTOMATED COUNT: 13 % (ref 11.5–14.5)
GAS FLOW.O2 O2 DELIVERY SYS: ABNORMAL
GLOBULIN SER CALC-MCNC: 3.5 G/DL (ref 2–4)
GLUCOSE BLD STRIP.AUTO-MCNC: 114 MG/DL (ref 65–117)
GLUCOSE BLD STRIP.AUTO-MCNC: 121 MG/DL (ref 65–117)
GLUCOSE BLD STRIP.AUTO-MCNC: 152 MG/DL (ref 65–117)
GLUCOSE BLD STRIP.AUTO-MCNC: 202 MG/DL (ref 65–117)
GLUCOSE SERPL-MCNC: 179 MG/DL (ref 65–100)
HCO3 BLD-SCNC: 24.5 MMOL/L (ref 22–26)
HCT VFR BLD AUTO: 32.1 % (ref 36.6–50.3)
HGB BLD-MCNC: 10.7 G/DL (ref 12.1–17)
IMM GRANULOCYTES # BLD AUTO: 0.1 K/UL (ref 0–0.04)
IMM GRANULOCYTES NFR BLD AUTO: 1 % (ref 0–0.5)
INR PPP: 1.1 (ref 0.9–1.1)
LYMPHOCYTES # BLD: 1.1 K/UL (ref 0.8–3.5)
LYMPHOCYTES NFR BLD: 11 % (ref 12–49)
MAGNESIUM SERPL-MCNC: 2.4 MG/DL (ref 1.6–2.4)
MCH RBC QN AUTO: 30.2 PG (ref 26–34)
MCHC RBC AUTO-ENTMCNC: 33.3 G/DL (ref 30–36.5)
MCV RBC AUTO: 90.7 FL (ref 80–99)
MONOCYTES # BLD: 0.4 K/UL (ref 0–1)
MONOCYTES NFR BLD: 4 % (ref 5–13)
NEUTS SEG # BLD: 8 K/UL (ref 1.8–8)
NEUTS SEG NFR BLD: 81 % (ref 32–75)
NRBC # BLD: 0 K/UL (ref 0–0.01)
NRBC BLD-RTO: 0 PER 100 WBC
P-R INTERVAL, ECG05: 194 MS
PCO2 BLD: 40.7 MMHG (ref 35–45)
PH BLD: 7.39 (ref 7.35–7.45)
PLATELET # BLD AUTO: 223 K/UL (ref 150–400)
PMV BLD AUTO: 10.6 FL (ref 8.9–12.9)
PO2 BLD: 75 MMHG (ref 80–100)
POTASSIUM SERPL-SCNC: 4 MMOL/L (ref 3.5–5.1)
PROT SERPL-MCNC: 7.1 G/DL (ref 6.4–8.2)
PROTHROMBIN TIME: 11.1 SEC (ref 9–11.1)
Q-T INTERVAL, ECG07: 420 MS
QRS DURATION, ECG06: 102 MS
QTC CALCULATION (BEZET), ECG08: 456 MS
RBC # BLD AUTO: 3.54 M/UL (ref 4.1–5.7)
SAO2 % BLD: 94.8 % (ref 92–97)
SERVICE CMNT-IMP: ABNORMAL
SERVICE CMNT-IMP: NORMAL
SODIUM SERPL-SCNC: 137 MMOL/L (ref 136–145)
SPECIMEN EXP DATE BLD: NORMAL
SPECIMEN TYPE: ABNORMAL
STATUS OF UNIT,%ST: NORMAL
THERAPEUTIC RANGE,PTTT: NORMAL SECS (ref 58–77)
UNIT DIVISION, %UDIV: 0
VENTRICULAR RATE, ECG03: 71 BPM
WBC # BLD AUTO: 10 K/UL (ref 4.1–11.1)

## 2023-05-04 PROCEDURE — 82962 GLUCOSE BLOOD TEST: CPT

## 2023-05-04 PROCEDURE — 74011250636 HC RX REV CODE- 250/636: Performed by: NURSE PRACTITIONER

## 2023-05-04 PROCEDURE — C1894 INTRO/SHEATH, NON-LASER: HCPCS | Performed by: INTERNAL MEDICINE

## 2023-05-04 PROCEDURE — 2709999900 HC NON-CHARGEABLE SUPPLY: Performed by: INTERNAL MEDICINE

## 2023-05-04 PROCEDURE — C1760 CLOSURE DEV, VASC: HCPCS | Performed by: INTERNAL MEDICINE

## 2023-05-04 PROCEDURE — 74011000250 HC RX REV CODE- 250: Performed by: INTERNAL MEDICINE

## 2023-05-04 PROCEDURE — 85730 THROMBOPLASTIN TIME PARTIAL: CPT

## 2023-05-04 PROCEDURE — 74011250637 HC RX REV CODE- 250/637: Performed by: NURSE PRACTITIONER

## 2023-05-04 PROCEDURE — C1769 GUIDE WIRE: HCPCS | Performed by: INTERNAL MEDICINE

## 2023-05-04 PROCEDURE — 74011250636 HC RX REV CODE- 250/636: Performed by: ANESTHESIOLOGY

## 2023-05-04 PROCEDURE — 36415 COLL VENOUS BLD VENIPUNCTURE: CPT

## 2023-05-04 PROCEDURE — 71045 X-RAY EXAM CHEST 1 VIEW: CPT

## 2023-05-04 PROCEDURE — 76210000003 HC OR PH I REC 3.5 TO 4 HR: Performed by: INTERNAL MEDICINE

## 2023-05-04 PROCEDURE — 93005 ELECTROCARDIOGRAM TRACING: CPT

## 2023-05-04 PROCEDURE — 65660000001 HC RM ICU INTERMED STEPDOWN

## 2023-05-04 PROCEDURE — 74011250637 HC RX REV CODE- 250/637: Performed by: ANESTHESIOLOGY

## 2023-05-04 PROCEDURE — 74011000250 HC RX REV CODE- 250: Performed by: NURSE ANESTHETIST, CERTIFIED REGISTERED

## 2023-05-04 PROCEDURE — 93355 ECHO TRANSESOPHAGEAL (TEE): CPT | Performed by: INTERNAL MEDICINE

## 2023-05-04 PROCEDURE — 77030040934 HC CATH DIAG DXTERITY MEDT -A: Performed by: INTERNAL MEDICINE

## 2023-05-04 PROCEDURE — 76010000129 HC CV SURG 1.5 TO 2 HR: Performed by: INTERNAL MEDICINE

## 2023-05-04 PROCEDURE — 93308 TTE F-UP OR LMTD: CPT

## 2023-05-04 PROCEDURE — 74011250636 HC RX REV CODE- 250/636: Performed by: NURSE ANESTHETIST, CERTIFIED REGISTERED

## 2023-05-04 PROCEDURE — 77030041064 HC CATH DX ANGI DXTRTY MEDT -B: Performed by: INTERNAL MEDICINE

## 2023-05-04 PROCEDURE — 83880 ASSAY OF NATRIURETIC PEPTIDE: CPT

## 2023-05-04 PROCEDURE — 77030019702 HC WRP THER MENM -C: Performed by: INTERNAL MEDICINE

## 2023-05-04 PROCEDURE — 77030041244 HC CBL PACE EXT TEMP REMG -B: Performed by: INTERNAL MEDICINE

## 2023-05-04 PROCEDURE — 77030037496 HC VLV AORT TRNSCTH -L: Performed by: INTERNAL MEDICINE

## 2023-05-04 PROCEDURE — 80053 COMPREHEN METABOLIC PANEL: CPT

## 2023-05-04 PROCEDURE — 85610 PROTHROMBIN TIME: CPT

## 2023-05-04 PROCEDURE — 82803 BLOOD GASES ANY COMBINATION: CPT

## 2023-05-04 PROCEDURE — 77030039825 HC MSK NSL PAP SUPERNO2VA VYRM -B: Performed by: ANESTHESIOLOGY

## 2023-05-04 PROCEDURE — 76060000034 HC ANESTHESIA 1.5 TO 2 HR: Performed by: INTERNAL MEDICINE

## 2023-05-04 PROCEDURE — 02RF38Z REPLACEMENT OF AORTIC VALVE WITH ZOOPLASTIC TISSUE, PERCUTANEOUS APPROACH: ICD-10-PCS | Performed by: THORACIC SURGERY (CARDIOTHORACIC VASCULAR SURGERY)

## 2023-05-04 PROCEDURE — 85025 COMPLETE CBC W/AUTO DIFF WBC: CPT

## 2023-05-04 PROCEDURE — 77030020061 HC IV BLD WRMR ADMIN SET 3M -B: Performed by: ANESTHESIOLOGY

## 2023-05-04 PROCEDURE — 83735 ASSAY OF MAGNESIUM: CPT

## 2023-05-04 PROCEDURE — 74011000250 HC RX REV CODE- 250: Performed by: NURSE PRACTITIONER

## 2023-05-04 PROCEDURE — B2111ZZ FLUOROSCOPY OF MULTIPLE CORONARY ARTERIES USING LOW OSMOLAR CONTRAST: ICD-10-PCS | Performed by: THORACIC SURGERY (CARDIOTHORACIC VASCULAR SURGERY)

## 2023-05-04 PROCEDURE — 4A023N7 MEASUREMENT OF CARDIAC SAMPLING AND PRESSURE, LEFT HEART, PERCUTANEOUS APPROACH: ICD-10-PCS | Performed by: THORACIC SURGERY (CARDIOTHORACIC VASCULAR SURGERY)

## 2023-05-04 PROCEDURE — 74011000258 HC RX REV CODE- 258: Performed by: NURSE ANESTHETIST, CERTIFIED REGISTERED

## 2023-05-04 PROCEDURE — 74011636637 HC RX REV CODE- 636/637: Performed by: NURSE PRACTITIONER

## 2023-05-04 PROCEDURE — 74011000636 HC RX REV CODE- 636: Performed by: INTERNAL MEDICINE

## 2023-05-04 DEVICE — VALVE HRT TRANSCATH 26MM SAPIEN 3 ULTRA: Type: IMPLANTABLE DEVICE | Site: AORTIC VALVE | Status: FUNCTIONAL

## 2023-05-04 RX ORDER — FUROSEMIDE 40 MG/1
40 TABLET ORAL DAILY
Status: DISCONTINUED | OUTPATIENT
Start: 2023-05-05 | End: 2023-05-05 | Stop reason: HOSPADM

## 2023-05-04 RX ORDER — HYDRALAZINE HYDROCHLORIDE 20 MG/ML
10 INJECTION INTRAMUSCULAR; INTRAVENOUS
Status: DISCONTINUED | OUTPATIENT
Start: 2023-05-04 | End: 2023-05-05 | Stop reason: HOSPADM

## 2023-05-04 RX ORDER — ACETAMINOPHEN 325 MG/1
650 TABLET ORAL
Status: DISCONTINUED | OUTPATIENT
Start: 2023-05-04 | End: 2023-05-05 | Stop reason: HOSPADM

## 2023-05-04 RX ORDER — SODIUM CHLORIDE 0.9 % (FLUSH) 0.9 %
5-40 SYRINGE (ML) INJECTION EVERY 8 HOURS
Status: DISCONTINUED | OUTPATIENT
Start: 2023-05-04 | End: 2023-05-04 | Stop reason: HOSPADM

## 2023-05-04 RX ORDER — TAMSULOSIN HYDROCHLORIDE 0.4 MG/1
0.4 CAPSULE ORAL DAILY
Status: DISCONTINUED | OUTPATIENT
Start: 2023-05-04 | End: 2023-05-05 | Stop reason: HOSPADM

## 2023-05-04 RX ORDER — MIDAZOLAM HYDROCHLORIDE 1 MG/ML
1 INJECTION, SOLUTION INTRAMUSCULAR; INTRAVENOUS AS NEEDED
Status: DISCONTINUED | OUTPATIENT
Start: 2023-05-04 | End: 2023-05-04 | Stop reason: HOSPADM

## 2023-05-04 RX ORDER — ONDANSETRON 2 MG/ML
4 INJECTION INTRAMUSCULAR; INTRAVENOUS AS NEEDED
Status: DISCONTINUED | OUTPATIENT
Start: 2023-05-04 | End: 2023-05-04 | Stop reason: HOSPADM

## 2023-05-04 RX ORDER — SODIUM CHLORIDE, SODIUM LACTATE, POTASSIUM CHLORIDE, CALCIUM CHLORIDE 600; 310; 30; 20 MG/100ML; MG/100ML; MG/100ML; MG/100ML
100 INJECTION, SOLUTION INTRAVENOUS CONTINUOUS
Status: DISCONTINUED | OUTPATIENT
Start: 2023-05-04 | End: 2023-05-04 | Stop reason: HOSPADM

## 2023-05-04 RX ORDER — AMOXICILLIN 250 MG
1 CAPSULE ORAL 2 TIMES DAILY
Status: DISCONTINUED | OUTPATIENT
Start: 2023-05-05 | End: 2023-05-05 | Stop reason: HOSPADM

## 2023-05-04 RX ORDER — SODIUM CHLORIDE 0.9 % (FLUSH) 0.9 %
5-40 SYRINGE (ML) INJECTION EVERY 8 HOURS
Status: DISCONTINUED | OUTPATIENT
Start: 2023-05-04 | End: 2023-05-05 | Stop reason: HOSPADM

## 2023-05-04 RX ORDER — PROPOFOL 10 MG/ML
INJECTION, EMULSION INTRAVENOUS
Status: DISCONTINUED | OUTPATIENT
Start: 2023-05-04 | End: 2023-05-04 | Stop reason: HOSPADM

## 2023-05-04 RX ORDER — PHENYLEPHRINE HCL IN 0.9% NACL 0.4MG/10ML
SYRINGE (ML) INTRAVENOUS
Status: DISCONTINUED | OUTPATIENT
Start: 2023-05-04 | End: 2023-05-04 | Stop reason: HOSPADM

## 2023-05-04 RX ORDER — ROSUVASTATIN CALCIUM 10 MG/1
5 TABLET, COATED ORAL EVERY EVENING
Status: DISCONTINUED | OUTPATIENT
Start: 2023-05-04 | End: 2023-05-05 | Stop reason: HOSPADM

## 2023-05-04 RX ORDER — KETAMINE HYDROCHLORIDE 10 MG/ML
INJECTION INTRAMUSCULAR; INTRAVENOUS AS NEEDED
Status: DISCONTINUED | OUTPATIENT
Start: 2023-05-04 | End: 2023-05-04 | Stop reason: HOSPADM

## 2023-05-04 RX ORDER — ACETAMINOPHEN 325 MG/1
650 TABLET ORAL ONCE
Status: COMPLETED | OUTPATIENT
Start: 2023-05-04 | End: 2023-05-04

## 2023-05-04 RX ORDER — CLOPIDOGREL BISULFATE 75 MG/1
75 TABLET ORAL DAILY
Status: DISCONTINUED | OUTPATIENT
Start: 2023-05-05 | End: 2023-05-05 | Stop reason: HOSPADM

## 2023-05-04 RX ORDER — SODIUM CHLORIDE, SODIUM LACTATE, POTASSIUM CHLORIDE, CALCIUM CHLORIDE 600; 310; 30; 20 MG/100ML; MG/100ML; MG/100ML; MG/100ML
INJECTION, SOLUTION INTRAVENOUS
Status: DISCONTINUED | OUTPATIENT
Start: 2023-05-04 | End: 2023-05-04 | Stop reason: HOSPADM

## 2023-05-04 RX ORDER — PROTAMINE SULFATE 10 MG/ML
INJECTION, SOLUTION INTRAVENOUS AS NEEDED
Status: DISCONTINUED | OUTPATIENT
Start: 2023-05-04 | End: 2023-05-04 | Stop reason: HOSPADM

## 2023-05-04 RX ORDER — PROPOFOL 10 MG/ML
INJECTION, EMULSION INTRAVENOUS AS NEEDED
Status: DISCONTINUED | OUTPATIENT
Start: 2023-05-04 | End: 2023-05-04 | Stop reason: HOSPADM

## 2023-05-04 RX ORDER — TRAMADOL HYDROCHLORIDE 50 MG/1
50-100 TABLET ORAL
Status: DISCONTINUED | OUTPATIENT
Start: 2023-05-04 | End: 2023-05-05 | Stop reason: HOSPADM

## 2023-05-04 RX ORDER — MORPHINE SULFATE 2 MG/ML
2 INJECTION, SOLUTION INTRAMUSCULAR; INTRAVENOUS
Status: DISCONTINUED | OUTPATIENT
Start: 2023-05-04 | End: 2023-05-04 | Stop reason: HOSPADM

## 2023-05-04 RX ORDER — SODIUM CHLORIDE 9 MG/ML
INJECTION, SOLUTION INTRAVENOUS
Status: DISCONTINUED | OUTPATIENT
Start: 2023-05-04 | End: 2023-05-04 | Stop reason: HOSPADM

## 2023-05-04 RX ORDER — FENTANYL CITRATE 50 UG/ML
25 INJECTION, SOLUTION INTRAMUSCULAR; INTRAVENOUS
Status: DISCONTINUED | OUTPATIENT
Start: 2023-05-04 | End: 2023-05-04 | Stop reason: HOSPADM

## 2023-05-04 RX ORDER — FENTANYL CITRATE 50 UG/ML
INJECTION, SOLUTION INTRAMUSCULAR; INTRAVENOUS AS NEEDED
Status: DISCONTINUED | OUTPATIENT
Start: 2023-05-04 | End: 2023-05-04

## 2023-05-04 RX ORDER — FENTANYL CITRATE 50 UG/ML
50 INJECTION, SOLUTION INTRAMUSCULAR; INTRAVENOUS AS NEEDED
Status: DISCONTINUED | OUTPATIENT
Start: 2023-05-04 | End: 2023-05-04 | Stop reason: HOSPADM

## 2023-05-04 RX ORDER — HYDROMORPHONE HYDROCHLORIDE 1 MG/ML
0.5 INJECTION, SOLUTION INTRAMUSCULAR; INTRAVENOUS; SUBCUTANEOUS
Status: DISCONTINUED | OUTPATIENT
Start: 2023-05-04 | End: 2023-05-04 | Stop reason: HOSPADM

## 2023-05-04 RX ORDER — OXYCODONE AND ACETAMINOPHEN 5; 325 MG/1; MG/1
1-2 TABLET ORAL
Status: DISCONTINUED | OUTPATIENT
Start: 2023-05-04 | End: 2023-05-05 | Stop reason: HOSPADM

## 2023-05-04 RX ORDER — LIDOCAINE HYDROCHLORIDE 10 MG/ML
INJECTION INFILTRATION; PERINEURAL AS NEEDED
Status: DISCONTINUED | OUTPATIENT
Start: 2023-05-04 | End: 2023-05-04 | Stop reason: HOSPADM

## 2023-05-04 RX ORDER — GUAIFENESIN 100 MG/5ML
81 LIQUID (ML) ORAL DAILY
Status: DISCONTINUED | OUTPATIENT
Start: 2023-05-05 | End: 2023-05-05 | Stop reason: HOSPADM

## 2023-05-04 RX ORDER — SODIUM CHLORIDE 0.9 % (FLUSH) 0.9 %
5-40 SYRINGE (ML) INJECTION AS NEEDED
Status: DISCONTINUED | OUTPATIENT
Start: 2023-05-04 | End: 2023-05-04 | Stop reason: HOSPADM

## 2023-05-04 RX ORDER — FACIAL-BODY WIPES
10 EACH TOPICAL DAILY PRN
Status: DISCONTINUED | OUTPATIENT
Start: 2023-05-04 | End: 2023-05-05 | Stop reason: HOSPADM

## 2023-05-04 RX ORDER — ONDANSETRON 2 MG/ML
4 INJECTION INTRAMUSCULAR; INTRAVENOUS
Status: DISCONTINUED | OUTPATIENT
Start: 2023-05-04 | End: 2023-05-05 | Stop reason: HOSPADM

## 2023-05-04 RX ORDER — SODIUM CHLORIDE 0.9 % (FLUSH) 0.9 %
5-40 SYRINGE (ML) INJECTION AS NEEDED
Status: DISCONTINUED | OUTPATIENT
Start: 2023-05-04 | End: 2023-05-05 | Stop reason: HOSPADM

## 2023-05-04 RX ORDER — IBUPROFEN 200 MG
4 TABLET ORAL AS NEEDED
Status: DISCONTINUED | OUTPATIENT
Start: 2023-05-04 | End: 2023-05-05 | Stop reason: HOSPADM

## 2023-05-04 RX ORDER — PHENYLEPHRINE HCL IN 0.9% NACL 0.4MG/10ML
SYRINGE (ML) INTRAVENOUS AS NEEDED
Status: DISCONTINUED | OUTPATIENT
Start: 2023-05-04 | End: 2023-05-04 | Stop reason: HOSPADM

## 2023-05-04 RX ORDER — MIDAZOLAM HYDROCHLORIDE 1 MG/ML
0.5 INJECTION, SOLUTION INTRAMUSCULAR; INTRAVENOUS
Status: DISCONTINUED | OUTPATIENT
Start: 2023-05-04 | End: 2023-05-04 | Stop reason: HOSPADM

## 2023-05-04 RX ORDER — NALOXONE HYDROCHLORIDE 0.4 MG/ML
0.4 INJECTION, SOLUTION INTRAMUSCULAR; INTRAVENOUS; SUBCUTANEOUS AS NEEDED
Status: DISCONTINUED | OUTPATIENT
Start: 2023-05-04 | End: 2023-05-05 | Stop reason: HOSPADM

## 2023-05-04 RX ORDER — INSULIN LISPRO 100 [IU]/ML
INJECTION, SOLUTION INTRAVENOUS; SUBCUTANEOUS
Status: DISCONTINUED | OUTPATIENT
Start: 2023-05-04 | End: 2023-05-05 | Stop reason: HOSPADM

## 2023-05-04 RX ORDER — SODIUM CHLORIDE 9 MG/ML
25 INJECTION, SOLUTION INTRAVENOUS CONTINUOUS
Status: DISCONTINUED | OUTPATIENT
Start: 2023-05-04 | End: 2023-05-04 | Stop reason: HOSPADM

## 2023-05-04 RX ORDER — EZETIMIBE 10 MG/1
10 TABLET ORAL
Status: DISCONTINUED | OUTPATIENT
Start: 2023-05-04 | End: 2023-05-05 | Stop reason: HOSPADM

## 2023-05-04 RX ORDER — ROPIVACAINE HYDROCHLORIDE 5 MG/ML
30 INJECTION, SOLUTION EPIDURAL; INFILTRATION; PERINEURAL AS NEEDED
Status: DISCONTINUED | OUTPATIENT
Start: 2023-05-04 | End: 2023-05-04 | Stop reason: HOSPADM

## 2023-05-04 RX ORDER — ALBUTEROL SULFATE 0.83 MG/ML
2.5 SOLUTION RESPIRATORY (INHALATION)
Status: DISCONTINUED | OUTPATIENT
Start: 2023-05-04 | End: 2023-05-05 | Stop reason: HOSPADM

## 2023-05-04 RX ORDER — LIDOCAINE HYDROCHLORIDE 10 MG/ML
0.1 INJECTION, SOLUTION EPIDURAL; INFILTRATION; INTRACAUDAL; PERINEURAL AS NEEDED
Status: DISCONTINUED | OUTPATIENT
Start: 2023-05-04 | End: 2023-05-04 | Stop reason: HOSPADM

## 2023-05-04 RX ORDER — DIPHENHYDRAMINE HYDROCHLORIDE 50 MG/ML
12.5 INJECTION, SOLUTION INTRAMUSCULAR; INTRAVENOUS AS NEEDED
Status: DISCONTINUED | OUTPATIENT
Start: 2023-05-04 | End: 2023-05-04 | Stop reason: HOSPADM

## 2023-05-04 RX ORDER — AMLODIPINE BESYLATE 5 MG/1
5 TABLET ORAL DAILY
Status: DISCONTINUED | OUTPATIENT
Start: 2023-05-04 | End: 2023-05-05 | Stop reason: HOSPADM

## 2023-05-04 RX ORDER — LIDOCAINE HYDROCHLORIDE 20 MG/ML
INJECTION, SOLUTION EPIDURAL; INFILTRATION; INTRACAUDAL; PERINEURAL AS NEEDED
Status: DISCONTINUED | OUTPATIENT
Start: 2023-05-04 | End: 2023-05-04 | Stop reason: HOSPADM

## 2023-05-04 RX ADMIN — Medication 120 MCG: at 09:10

## 2023-05-04 RX ADMIN — CEFAZOLIN 2 G: 1 INJECTION, POWDER, FOR SOLUTION INTRAMUSCULAR; INTRAVENOUS at 17:17

## 2023-05-04 RX ADMIN — DEXMEDETOMIDINE HYDROCHLORIDE 10 MCG: 100 INJECTION, SOLUTION, CONCENTRATE INTRAVENOUS at 08:08

## 2023-05-04 RX ADMIN — LIDOCAINE HYDROCHLORIDE 100 MG: 20 INJECTION, SOLUTION EPIDURAL; INFILTRATION; INTRACAUDAL; PERINEURAL at 08:10

## 2023-05-04 RX ADMIN — SODIUM CHLORIDE, PRESERVATIVE FREE 10 ML: 5 INJECTION INTRAVENOUS at 21:05

## 2023-05-04 RX ADMIN — SODIUM CHLORIDE: 900 INJECTION, SOLUTION INTRAVENOUS at 08:00

## 2023-05-04 RX ADMIN — ACETAMINOPHEN 650 MG: 325 TABLET ORAL at 06:46

## 2023-05-04 RX ADMIN — HYDRALAZINE HYDROCHLORIDE 10 MG: 20 INJECTION INTRAMUSCULAR; INTRAVENOUS at 10:48

## 2023-05-04 RX ADMIN — FENTANYL CITRATE 100 MCG: 50 INJECTION, SOLUTION INTRAMUSCULAR; INTRAVENOUS at 07:25

## 2023-05-04 RX ADMIN — SODIUM CHLORIDE: 9 INJECTION, SOLUTION INTRAVENOUS at 07:30

## 2023-05-04 RX ADMIN — TAMSULOSIN HYDROCHLORIDE 0.4 MG: 0.4 CAPSULE ORAL at 17:17

## 2023-05-04 RX ADMIN — Medication 40 MCG/MIN: at 08:11

## 2023-05-04 RX ADMIN — EZETIMIBE 10 MG: 10 TABLET ORAL at 21:04

## 2023-05-04 RX ADMIN — WATER 2 G: 1 INJECTION INTRAMUSCULAR; INTRAVENOUS; SUBCUTANEOUS at 08:29

## 2023-05-04 RX ADMIN — CEFAZOLIN 2 G: 1 INJECTION, POWDER, FOR SOLUTION INTRAMUSCULAR; INTRAVENOUS at 21:04

## 2023-05-04 RX ADMIN — Medication 20 MG: at 08:08

## 2023-05-04 RX ADMIN — Medication 120 MCG: at 09:08

## 2023-05-04 RX ADMIN — SODIUM CHLORIDE, POTASSIUM CHLORIDE, SODIUM LACTATE AND CALCIUM CHLORIDE: 600; 310; 30; 20 INJECTION, SOLUTION INTRAVENOUS at 08:00

## 2023-05-04 RX ADMIN — AMLODIPINE BESYLATE 5 MG: 5 TABLET ORAL at 17:17

## 2023-05-04 RX ADMIN — PROPOFOL 50 MG: 10 INJECTION, EMULSION INTRAVENOUS at 08:10

## 2023-05-04 RX ADMIN — Medication 2 UNITS: at 17:17

## 2023-05-04 RX ADMIN — PROPOFOL 50 MCG/KG/MIN: 10 INJECTION, EMULSION INTRAVENOUS at 08:08

## 2023-05-04 RX ADMIN — PROTAMINE SULFATE 150 MG: 10 INJECTION, SOLUTION INTRAVENOUS at 09:18

## 2023-05-04 RX ADMIN — ROSUVASTATIN 5 MG: 10 TABLET, FILM COATED ORAL at 17:17

## 2023-05-04 RX ADMIN — MIDAZOLAM 2 MG: 1 INJECTION INTRAMUSCULAR; INTRAVENOUS at 07:25

## 2023-05-05 ENCOUNTER — APPOINTMENT (OUTPATIENT)
Dept: NON INVASIVE DIAGNOSTICS | Age: 73
DRG: 267 | End: 2023-05-05
Attending: NURSE PRACTITIONER
Payer: MEDICARE

## 2023-05-05 VITALS
OXYGEN SATURATION: 96 % | DIASTOLIC BLOOD PRESSURE: 58 MMHG | TEMPERATURE: 98.6 F | RESPIRATION RATE: 22 BRPM | SYSTOLIC BLOOD PRESSURE: 138 MMHG | HEIGHT: 66 IN | WEIGHT: 227.96 LBS | BODY MASS INDEX: 36.64 KG/M2 | HEART RATE: 90 BPM

## 2023-05-05 PROBLEM — Z95.2 S/P TAVR (TRANSCATHETER AORTIC VALVE REPLACEMENT): Status: ACTIVE | Noted: 2023-05-05

## 2023-05-05 LAB
ANION GAP SERPL CALC-SCNC: 5 MMOL/L (ref 5–15)
ATRIAL RATE: 85 BPM
BUN SERPL-MCNC: 25 MG/DL (ref 6–20)
BUN/CREAT SERPL: 18 (ref 12–20)
CALCIUM SERPL-MCNC: 9 MG/DL (ref 8.5–10.1)
CALCULATED P AXIS, ECG09: 55 DEGREES
CALCULATED R AXIS, ECG10: 21 DEGREES
CALCULATED T AXIS, ECG11: 171 DEGREES
CHLORIDE SERPL-SCNC: 108 MMOL/L (ref 97–108)
CO2 SERPL-SCNC: 25 MMOL/L (ref 21–32)
CREAT SERPL-MCNC: 1.36 MG/DL (ref 0.7–1.3)
DIAGNOSIS, 93000: NORMAL
ECHO AV AREA PEAK VELOCITY: 1.4 CM2
ECHO AV AREA VTI: 1.7 CM2
ECHO AV AREA/BSA PEAK VELOCITY: 0.7 CM2/M2
ECHO AV AREA/BSA VTI: 0.8 CM2/M2
ECHO AV MEAN GRADIENT: 18 MMHG
ECHO AV MEAN VELOCITY: 2 M/S
ECHO AV PEAK GRADIENT: 25 MMHG
ECHO AV PEAK VELOCITY: 2.5 M/S
ECHO AV VELOCITY RATIO: 0.48
ECHO AV VTI: 47.1 CM
ECHO LV E' LATERAL VELOCITY: 7 CM/S
ECHO LV E' SEPTAL VELOCITY: 6 CM/S
ECHO LV FRACTIONAL SHORTENING: 34 % (ref 28–44)
ECHO LV INTERNAL DIMENSION DIASTOLE INDEX: 1.52 CM/M2
ECHO LV INTERNAL DIMENSION DIASTOLIC: 3.2 CM (ref 4.2–5.9)
ECHO LV INTERNAL DIMENSION SYSTOLIC INDEX: 1 CM/M2
ECHO LV INTERNAL DIMENSION SYSTOLIC: 2.1 CM
ECHO LV IVSD: 1.5 CM (ref 0.6–1)
ECHO LV MASS 2D: 162.2 G (ref 88–224)
ECHO LV MASS INDEX 2D: 76.9 G/M2 (ref 49–115)
ECHO LV POSTERIOR WALL DIASTOLIC: 1.4 CM (ref 0.6–1)
ECHO LV RELATIVE WALL THICKNESS RATIO: 0.88
ECHO LVOT AREA: 2.8 CM2
ECHO LVOT AV VTI INDEX: 0.57
ECHO LVOT DIAM: 1.9 CM
ECHO LVOT MEAN GRADIENT: 3 MMHG
ECHO LVOT PEAK GRADIENT: 6 MMHG
ECHO LVOT PEAK VELOCITY: 1.2 M/S
ECHO LVOT STROKE VOLUME INDEX: 36.1 ML/M2
ECHO LVOT SV: 76.2 ML
ECHO LVOT VTI: 26.9 CM
ECHO MV A VELOCITY: 1.47 M/S
ECHO MV E VELOCITY: 0.93 M/S
ECHO MV E/A RATIO: 0.63
ECHO MV E/E' LATERAL: 13.29
ECHO MV E/E' RATIO (AVERAGED): 14.39
ECHO MV E/E' SEPTAL: 15.5
ECHO RV TAPSE: 3.2 CM (ref 1.7–?)
ERYTHROCYTE [DISTWIDTH] IN BLOOD BY AUTOMATED COUNT: 13.2 % (ref 11.5–14.5)
GLUCOSE BLD STRIP.AUTO-MCNC: 171 MG/DL (ref 65–117)
GLUCOSE SERPL-MCNC: 175 MG/DL (ref 65–100)
HCT VFR BLD AUTO: 35 % (ref 36.6–50.3)
HGB BLD-MCNC: 11.5 G/DL (ref 12.1–17)
MCH RBC QN AUTO: 30.3 PG (ref 26–34)
MCHC RBC AUTO-ENTMCNC: 32.9 G/DL (ref 30–36.5)
MCV RBC AUTO: 92.1 FL (ref 80–99)
NRBC # BLD: 0 K/UL (ref 0–0.01)
NRBC BLD-RTO: 0 PER 100 WBC
P-R INTERVAL, ECG05: 126 MS
PLATELET # BLD AUTO: 233 K/UL (ref 150–400)
PMV BLD AUTO: 10.5 FL (ref 8.9–12.9)
POTASSIUM SERPL-SCNC: 4 MMOL/L (ref 3.5–5.1)
Q-T INTERVAL, ECG07: 374 MS
QRS DURATION, ECG06: 96 MS
QTC CALCULATION (BEZET), ECG08: 445 MS
RBC # BLD AUTO: 3.8 M/UL (ref 4.1–5.7)
SERVICE CMNT-IMP: ABNORMAL
SODIUM SERPL-SCNC: 138 MMOL/L (ref 136–145)
VENTRICULAR RATE, ECG03: 85 BPM
WBC # BLD AUTO: 10.9 K/UL (ref 4.1–11.1)

## 2023-05-05 PROCEDURE — 93005 ELECTROCARDIOGRAM TRACING: CPT

## 2023-05-05 PROCEDURE — 85027 COMPLETE CBC AUTOMATED: CPT

## 2023-05-05 PROCEDURE — 74011000250 HC RX REV CODE- 250: Performed by: NURSE PRACTITIONER

## 2023-05-05 PROCEDURE — 74011636637 HC RX REV CODE- 636/637: Performed by: NURSE PRACTITIONER

## 2023-05-05 PROCEDURE — 82962 GLUCOSE BLOOD TEST: CPT

## 2023-05-05 PROCEDURE — 93306 TTE W/DOPPLER COMPLETE: CPT

## 2023-05-05 PROCEDURE — 36415 COLL VENOUS BLD VENIPUNCTURE: CPT

## 2023-05-05 PROCEDURE — 80048 BASIC METABOLIC PNL TOTAL CA: CPT

## 2023-05-05 PROCEDURE — APPSS45 APP SPLIT SHARED TIME 31-45 MINUTES: Performed by: NURSE PRACTITIONER

## 2023-05-05 PROCEDURE — 74011250637 HC RX REV CODE- 250/637: Performed by: NURSE PRACTITIONER

## 2023-05-05 PROCEDURE — 74011250636 HC RX REV CODE- 250/636: Performed by: NURSE PRACTITIONER

## 2023-05-05 RX ORDER — ACETAMINOPHEN 325 MG/1
650 TABLET ORAL
Status: SHIPPED | COMMUNITY
Start: 2023-05-05

## 2023-05-05 RX ORDER — METOPROLOL SUCCINATE 25 MG/1
25 TABLET, EXTENDED RELEASE ORAL DAILY
Status: DISCONTINUED | OUTPATIENT
Start: 2023-05-05 | End: 2023-05-05 | Stop reason: HOSPADM

## 2023-05-05 RX ADMIN — TAMSULOSIN HYDROCHLORIDE 0.4 MG: 0.4 CAPSULE ORAL at 09:36

## 2023-05-05 RX ADMIN — AMLODIPINE BESYLATE 5 MG: 5 TABLET ORAL at 09:36

## 2023-05-05 RX ADMIN — CEFAZOLIN 2 G: 1 INJECTION, POWDER, FOR SOLUTION INTRAMUSCULAR; INTRAVENOUS at 06:52

## 2023-05-05 RX ADMIN — ASPIRIN 81 MG CHEWABLE TABLET 81 MG: 81 TABLET CHEWABLE at 09:36

## 2023-05-05 RX ADMIN — DOCUSATE SODIUM 50 MG AND SENNOSIDES 8.6 MG 1 TABLET: 8.6; 5 TABLET, FILM COATED ORAL at 09:36

## 2023-05-05 RX ADMIN — SODIUM CHLORIDE, PRESERVATIVE FREE 10 ML: 5 INJECTION INTRAVENOUS at 06:51

## 2023-05-05 RX ADMIN — FUROSEMIDE 40 MG: 40 TABLET ORAL at 09:36

## 2023-05-05 RX ADMIN — CLOPIDOGREL BISULFATE 75 MG: 75 TABLET ORAL at 09:36

## 2023-05-05 RX ADMIN — Medication 2 UNITS: at 06:52

## 2023-05-08 ENCOUNTER — OFFICE VISIT (OUTPATIENT)
Age: 73
End: 2023-05-08
Payer: MEDICARE

## 2023-05-08 DIAGNOSIS — I35.0 NONRHEUMATIC AORTIC VALVE STENOSIS: Primary | ICD-10-CM

## 2023-05-08 DIAGNOSIS — Z95.2 S/P TAVR (TRANSCATHETER AORTIC VALVE REPLACEMENT): ICD-10-CM

## 2023-05-08 PROCEDURE — 99442 PR PHYS/QHP TELEPHONE EVALUATION 11-20 MIN: CPT | Performed by: NURSE PRACTITIONER

## 2023-05-12 ENCOUNTER — APPOINTMENT (OUTPATIENT)
Dept: CARDIAC REHAB | Age: 73
End: 2023-05-12

## 2023-06-05 ENCOUNTER — APPOINTMENT (OUTPATIENT)
Facility: HOSPITAL | Age: 73
End: 2023-06-05
Payer: MEDICARE

## 2023-06-08 ENCOUNTER — APPOINTMENT (OUTPATIENT)
Facility: HOSPITAL | Age: 73
End: 2023-06-08
Payer: MEDICARE

## 2023-06-12 ENCOUNTER — APPOINTMENT (OUTPATIENT)
Facility: HOSPITAL | Age: 73
End: 2023-06-12
Payer: MEDICARE

## 2023-06-15 ENCOUNTER — APPOINTMENT (OUTPATIENT)
Facility: HOSPITAL | Age: 73
End: 2023-06-15
Payer: MEDICARE

## 2023-06-19 ENCOUNTER — HOSPITAL ENCOUNTER (OUTPATIENT)
Facility: HOSPITAL | Age: 73
Setting detail: RECURRING SERIES
Discharge: HOME | End: 2023-06-22
Payer: MEDICARE

## 2023-06-19 VITALS — BODY MASS INDEX: 31.6 KG/M2 | WEIGHT: 233 LBS

## 2023-06-19 PROCEDURE — 93798 PHYS/QHP OP CAR RHAB W/ECG: CPT

## 2023-06-19 ASSESSMENT — EJECTION FRACTION: EF_VALUE: 60

## 2023-06-19 ASSESSMENT — EXERCISE STRESS TEST
PEAK_RPE: 12
PEAK_METS: 1.8

## 2023-06-19 ASSESSMENT — LIFESTYLE VARIABLES: SMOKELESS_TOBACCO: NO

## 2023-06-22 ENCOUNTER — HOSPITAL ENCOUNTER (OUTPATIENT)
Facility: HOSPITAL | Age: 73
Setting detail: RECURRING SERIES
Discharge: HOME | End: 2023-06-25
Payer: MEDICARE

## 2023-06-22 VITALS — BODY MASS INDEX: 31.19 KG/M2 | WEIGHT: 230 LBS

## 2023-06-22 PROCEDURE — 93798 PHYS/QHP OP CAR RHAB W/ECG: CPT

## 2023-06-22 ASSESSMENT — EXERCISE STRESS TEST
PEAK_METS: 1.8
PEAK_RPE: 12

## 2023-06-26 ENCOUNTER — HOSPITAL ENCOUNTER (OUTPATIENT)
Facility: HOSPITAL | Age: 73
Setting detail: RECURRING SERIES
Discharge: HOME | End: 2023-06-29
Payer: MEDICARE

## 2023-06-26 VITALS — WEIGHT: 229 LBS | BODY MASS INDEX: 31.06 KG/M2

## 2023-06-26 PROCEDURE — 93798 PHYS/QHP OP CAR RHAB W/ECG: CPT

## 2023-06-26 ASSESSMENT — EXERCISE STRESS TEST
PEAK_RPE: 12
PEAK_METS: 1.8

## 2023-06-28 ENCOUNTER — OFFICE VISIT (OUTPATIENT)
Age: 73
End: 2023-06-28
Payer: MEDICARE

## 2023-06-28 ENCOUNTER — HOSPITAL ENCOUNTER (OUTPATIENT)
Facility: HOSPITAL | Age: 73
Setting detail: RECURRING SERIES
Discharge: HOME | End: 2023-07-01
Payer: MEDICARE

## 2023-06-28 VITALS
HEART RATE: 72 BPM | SYSTOLIC BLOOD PRESSURE: 158 MMHG | BODY MASS INDEX: 31.36 KG/M2 | DIASTOLIC BLOOD PRESSURE: 78 MMHG | WEIGHT: 231.2 LBS | OXYGEN SATURATION: 96 %

## 2023-06-28 VITALS — BODY MASS INDEX: 31.19 KG/M2 | WEIGHT: 230 LBS

## 2023-06-28 DIAGNOSIS — E78.2 MIXED HYPERLIPIDEMIA: ICD-10-CM

## 2023-06-28 DIAGNOSIS — E11.9 TYPE 2 DIABETES MELLITUS WITHOUT COMPLICATION, WITHOUT LONG-TERM CURRENT USE OF INSULIN (HCC): ICD-10-CM

## 2023-06-28 DIAGNOSIS — Z95.2 S/P TAVR (TRANSCATHETER AORTIC VALVE REPLACEMENT): ICD-10-CM

## 2023-06-28 DIAGNOSIS — I35.0 NONRHEUMATIC AORTIC VALVE STENOSIS: ICD-10-CM

## 2023-06-28 DIAGNOSIS — I25.10 ATHEROSCLEROSIS OF NATIVE CORONARY ARTERY OF NATIVE HEART WITHOUT ANGINA PECTORIS: Primary | ICD-10-CM

## 2023-06-28 DIAGNOSIS — I10 ESSENTIAL (PRIMARY) HYPERTENSION: ICD-10-CM

## 2023-06-28 PROCEDURE — 99214 OFFICE O/P EST MOD 30 MIN: CPT | Performed by: INTERNAL MEDICINE

## 2023-06-28 PROCEDURE — 93798 PHYS/QHP OP CAR RHAB W/ECG: CPT

## 2023-06-28 ASSESSMENT — PATIENT HEALTH QUESTIONNAIRE - PHQ9
SUM OF ALL RESPONSES TO PHQ QUESTIONS 1-9: 0
SUM OF ALL RESPONSES TO PHQ QUESTIONS 1-9: 0
2. FEELING DOWN, DEPRESSED OR HOPELESS: 0
SUM OF ALL RESPONSES TO PHQ QUESTIONS 1-9: 0
SUM OF ALL RESPONSES TO PHQ9 QUESTIONS 1 & 2: 0
1. LITTLE INTEREST OR PLEASURE IN DOING THINGS: 0
SUM OF ALL RESPONSES TO PHQ QUESTIONS 1-9: 0

## 2023-06-28 ASSESSMENT — EXERCISE STRESS TEST
PEAK_METS: 1.8
PEAK_RPE: 12

## 2023-07-06 ENCOUNTER — HOSPITAL ENCOUNTER (OUTPATIENT)
Facility: HOSPITAL | Age: 73
Setting detail: RECURRING SERIES
Discharge: HOME | End: 2023-07-09
Payer: MEDICARE

## 2023-07-06 VITALS — WEIGHT: 232 LBS | BODY MASS INDEX: 31.46 KG/M2

## 2023-07-06 PROCEDURE — 93798 PHYS/QHP OP CAR RHAB W/ECG: CPT

## 2023-07-06 ASSESSMENT — EXERCISE STRESS TEST
PEAK_METS: 1.8
PEAK_RPE: 12

## 2023-07-10 ENCOUNTER — HOSPITAL ENCOUNTER (OUTPATIENT)
Facility: HOSPITAL | Age: 73
Setting detail: RECURRING SERIES
Discharge: HOME | End: 2023-07-13
Payer: MEDICARE

## 2023-07-10 VITALS — WEIGHT: 228 LBS | BODY MASS INDEX: 30.92 KG/M2

## 2023-07-10 PROCEDURE — 93798 PHYS/QHP OP CAR RHAB W/ECG: CPT

## 2023-07-10 ASSESSMENT — EXERCISE STRESS TEST
PEAK_RPE: 12
PEAK_METS: 2.1

## 2023-07-10 ASSESSMENT — EJECTION FRACTION: EF_VALUE: 60

## 2023-07-10 ASSESSMENT — LIFESTYLE VARIABLES: SMOKELESS_TOBACCO: NO

## 2023-07-12 ENCOUNTER — HOSPITAL ENCOUNTER (OUTPATIENT)
Facility: HOSPITAL | Age: 73
Setting detail: RECURRING SERIES
Discharge: HOME | End: 2023-07-15
Payer: MEDICARE

## 2023-07-12 VITALS — BODY MASS INDEX: 30.79 KG/M2 | WEIGHT: 227 LBS

## 2023-07-12 PROCEDURE — 93798 PHYS/QHP OP CAR RHAB W/ECG: CPT

## 2023-07-12 ASSESSMENT — EXERCISE STRESS TEST
PEAK_RPE: 12
PEAK_METS: 2.1

## 2023-07-17 ENCOUNTER — HOSPITAL ENCOUNTER (OUTPATIENT)
Facility: HOSPITAL | Age: 73
Setting detail: RECURRING SERIES
Discharge: HOME | End: 2023-07-20
Payer: MEDICARE

## 2023-07-17 VITALS — WEIGHT: 227 LBS | BODY MASS INDEX: 30.79 KG/M2

## 2023-07-17 PROCEDURE — 93798 PHYS/QHP OP CAR RHAB W/ECG: CPT

## 2023-07-17 ASSESSMENT — EXERCISE STRESS TEST
PEAK_RPE: 12
PEAK_METS: 2.1

## 2023-07-17 ASSESSMENT — PATIENT HEALTH QUESTIONNAIRE - PHQ9
6. FEELING BAD ABOUT YOURSELF - OR THAT YOU ARE A FAILURE OR HAVE LET YOURSELF OR YOUR FAMILY DOWN: 0
4. FEELING TIRED OR HAVING LITTLE ENERGY: 1
8. MOVING OR SPEAKING SO SLOWLY THAT OTHER PEOPLE COULD HAVE NOTICED. OR THE OPPOSITE, BEING SO FIGETY OR RESTLESS THAT YOU HAVE BEEN MOVING AROUND A LOT MORE THAN USUAL: 0
9. THOUGHTS THAT YOU WOULD BE BETTER OFF DEAD, OR OF HURTING YOURSELF: 0
SUM OF ALL RESPONSES TO PHQ9 QUESTIONS 1 & 2: 0
SUM OF ALL RESPONSES TO PHQ QUESTIONS 1-9: 1
7. TROUBLE CONCENTRATING ON THINGS, SUCH AS READING THE NEWSPAPER OR WATCHING TELEVISION: 0
10. IF YOU CHECKED OFF ANY PROBLEMS, HOW DIFFICULT HAVE THESE PROBLEMS MADE IT FOR YOU TO DO YOUR WORK, TAKE CARE OF THINGS AT HOME, OR GET ALONG WITH OTHER PEOPLE: 0
SUM OF ALL RESPONSES TO PHQ QUESTIONS 1-9: 1
1. LITTLE INTEREST OR PLEASURE IN DOING THINGS: 0
3. TROUBLE FALLING OR STAYING ASLEEP: 0
SUM OF ALL RESPONSES TO PHQ QUESTIONS 1-9: 1
5. POOR APPETITE OR OVEREATING: 0
2. FEELING DOWN, DEPRESSED OR HOPELESS: 0
SUM OF ALL RESPONSES TO PHQ QUESTIONS 1-9: 1

## 2023-07-19 ENCOUNTER — HOSPITAL ENCOUNTER (OUTPATIENT)
Facility: HOSPITAL | Age: 73
Setting detail: RECURRING SERIES
Discharge: HOME | End: 2023-07-22
Payer: MEDICARE

## 2023-07-19 VITALS — WEIGHT: 228 LBS | BODY MASS INDEX: 30.92 KG/M2

## 2023-07-19 PROCEDURE — 93798 PHYS/QHP OP CAR RHAB W/ECG: CPT

## 2023-07-19 ASSESSMENT — EXERCISE STRESS TEST
PEAK_RPE: 12
PEAK_METS: 2.1

## 2023-07-23 PROCEDURE — 93798 PHYS/QHP OP CAR RHAB W/ECG: CPT

## 2023-07-24 ENCOUNTER — HOSPITAL ENCOUNTER (OUTPATIENT)
Facility: HOSPITAL | Age: 73
Setting detail: RECURRING SERIES
Discharge: HOME | End: 2023-07-27
Payer: MEDICARE

## 2023-07-24 VITALS — WEIGHT: 228 LBS | BODY MASS INDEX: 30.92 KG/M2

## 2023-07-24 PROCEDURE — 93798 PHYS/QHP OP CAR RHAB W/ECG: CPT

## 2023-07-24 ASSESSMENT — EXERCISE STRESS TEST
PEAK_RPE: 12
PEAK_METS: 2.1

## 2023-07-26 ENCOUNTER — HOSPITAL ENCOUNTER (OUTPATIENT)
Facility: HOSPITAL | Age: 73
Setting detail: RECURRING SERIES
Discharge: HOME | End: 2023-07-29
Payer: MEDICARE

## 2023-07-26 VITALS — WEIGHT: 228 LBS | BODY MASS INDEX: 30.92 KG/M2

## 2023-07-26 PROCEDURE — 93798 PHYS/QHP OP CAR RHAB W/ECG: CPT

## 2023-07-26 ASSESSMENT — EXERCISE STRESS TEST
PEAK_METS: 2.1
PEAK_RPE: 12

## 2023-07-31 ENCOUNTER — HOSPITAL ENCOUNTER (OUTPATIENT)
Facility: HOSPITAL | Age: 73
Setting detail: RECURRING SERIES
Discharge: HOME | End: 2023-08-03
Payer: MEDICARE

## 2023-07-31 VITALS — BODY MASS INDEX: 30.92 KG/M2 | WEIGHT: 228 LBS

## 2023-07-31 PROCEDURE — 93798 PHYS/QHP OP CAR RHAB W/ECG: CPT

## 2023-07-31 ASSESSMENT — EXERCISE STRESS TEST
PEAK_RPE: 12
PEAK_METS: 2.1

## 2023-08-01 DIAGNOSIS — Z95.4 S/P AORTIC VALVE ALLOGRAFT: Primary | ICD-10-CM

## 2023-08-02 ENCOUNTER — HOSPITAL ENCOUNTER (OUTPATIENT)
Facility: HOSPITAL | Age: 73
Setting detail: RECURRING SERIES
Discharge: HOME | End: 2023-08-05
Payer: MEDICARE

## 2023-08-02 VITALS — BODY MASS INDEX: 30.79 KG/M2 | WEIGHT: 227 LBS

## 2023-08-02 PROCEDURE — 93798 PHYS/QHP OP CAR RHAB W/ECG: CPT

## 2023-08-02 ASSESSMENT — EXERCISE STRESS TEST
PEAK_BP: 174/70
PEAK_METS: 2.8
PEAK_HR: 98
PEAK_RPE: 13
PEAK_BP: 174/70

## 2023-08-02 ASSESSMENT — LIFESTYLE VARIABLES: SMOKELESS_TOBACCO: NO

## 2023-08-02 ASSESSMENT — EJECTION FRACTION: EF_VALUE: 60

## 2023-08-02 NOTE — CARDIO/PULMONARY
DISCHARGE SUMMARY NOTE  2023      NAME: Saba Srivastava : 1950 AGE: 74 y.o. GENDER: male    CARDIAC REHAB ADMITTING DIAGNOSIS: Non-ST elevation (NSTEMI) myocardial infarction [I21.4]  Presence of coronary angioplasty implant and graft [Z95.5]    REFERRING PHYSICIAN: Tomeka Lucero MD    MEDICAL HX:  Past Medical History:   Diagnosis Date    Diabetes ([address])     Hepatitis     not active    Hypertension     Kidney stones     Malaria     while serving in [employer]:     No results found for: HBA1C, UJE2HZCV  No results found for: CHOL, CHOLPOCT, CHOLX, CHLST, CHOLV, HDL, HDLPOC, HDLC, LDL, LDLC, VLDLC, VLDL, TGLX, TRIGL      ANTHROPOMETRICS:      Ht Readings from Last 1 Encounters:   06/15/23 1.829 m (6')      Wt Readings from Last 1 Encounters:   23 103 kg (227 lb)        WAIST: 44.5         PROGRAM SUMMARY:    Saba Srivastava completed 36/36 sessions in the Cardiac Rehab program. He will continue exercising 5 x week and focus on diet and nutrition at home. He attended 1 classes and is aware of his cardiac risk factors and cardiac medications. Weight loss was 19 lbs. MET level increase from 2 to 2.8. Exercise tolerance test was done on the biostep:  marino 80, RPM 76, and # of steps were 778. Questions addressed. Discharge plans discussed. Saba Srivastava verbalized understanding.       Cindy Day RN

## 2023-09-05 RX ORDER — CLOPIDOGREL BISULFATE 75 MG/1
75 TABLET ORAL DAILY
Qty: 90 TABLET | Refills: 1 | Status: SHIPPED | OUTPATIENT
Start: 2023-09-05

## 2023-09-05 RX ORDER — METOPROLOL SUCCINATE 25 MG/1
25 TABLET, EXTENDED RELEASE ORAL DAILY
Qty: 90 TABLET | Refills: 1 | Status: SHIPPED | OUTPATIENT
Start: 2023-09-05

## 2023-09-05 NOTE — TELEPHONE ENCOUNTER
Requested Prescriptions     Signed Prescriptions Disp Refills    metoprolol succinate (TOPROL XL) 25 MG extended release tablet 90 tablet 1     Sig: TAKE 1 TABLET BY MOUTH DAILY     Authorizing Provider: Jelani Clark     Ordering User: REBADANA    clopidogrel (PLAVIX) 75 MG tablet 90 tablet 1     Sig: TAKE 1 TABLET BY MOUTH DAILY     Authorizing Provider: Jelani Clark     Ordering User:  Antonio faust Md    Future Appointments   Date Time Provider [address] [place] [address]   12/28/2023 10:20 AM MD SHAN Mckeon AMB   4/4/2024 10:30 AM [facility] ECHO LAB 1 [facility]   4/4/2024 11:30 AM JEFE Torres NP [facility] BS AMB

## 2024-02-07 ENCOUNTER — OFFICE VISIT (OUTPATIENT)
Age: 74
End: 2024-02-07
Payer: MEDICARE

## 2024-02-07 VITALS
WEIGHT: 215.6 LBS | SYSTOLIC BLOOD PRESSURE: 126 MMHG | OXYGEN SATURATION: 97 % | BODY MASS INDEX: 29.2 KG/M2 | HEIGHT: 72 IN | DIASTOLIC BLOOD PRESSURE: 62 MMHG | HEART RATE: 76 BPM

## 2024-02-07 DIAGNOSIS — E78.2 MIXED HYPERLIPIDEMIA: ICD-10-CM

## 2024-02-07 DIAGNOSIS — I25.10 ATHEROSCLEROSIS OF NATIVE CORONARY ARTERY OF NATIVE HEART WITHOUT ANGINA PECTORIS: Primary | ICD-10-CM

## 2024-02-07 DIAGNOSIS — E11.9 TYPE 2 DIABETES MELLITUS WITHOUT COMPLICATION, WITH LONG-TERM CURRENT USE OF INSULIN (HCC): ICD-10-CM

## 2024-02-07 DIAGNOSIS — Z95.2 S/P TAVR (TRANSCATHETER AORTIC VALVE REPLACEMENT): ICD-10-CM

## 2024-02-07 DIAGNOSIS — Z79.4 TYPE 2 DIABETES MELLITUS WITHOUT COMPLICATION, WITH LONG-TERM CURRENT USE OF INSULIN (HCC): ICD-10-CM

## 2024-02-07 PROCEDURE — 99214 OFFICE O/P EST MOD 30 MIN: CPT | Performed by: INTERNAL MEDICINE

## 2024-02-07 RX ORDER — SITAGLIPTIN 100 MG/1
100 TABLET, FILM COATED ORAL DAILY
COMMUNITY
Start: 2024-01-24

## 2024-02-07 RX ORDER — INSULIN GLARGINE 100 [IU]/ML
INJECTION, SOLUTION SUBCUTANEOUS
COMMUNITY
Start: 2024-01-16

## 2024-02-07 RX ORDER — EMPAGLIFLOZIN 25 MG/1
TABLET, FILM COATED ORAL
COMMUNITY
Start: 2024-02-01

## 2024-02-07 ASSESSMENT — PATIENT HEALTH QUESTIONNAIRE - PHQ9
SUM OF ALL RESPONSES TO PHQ QUESTIONS 1-9: 0
SUM OF ALL RESPONSES TO PHQ9 QUESTIONS 1 & 2: 0
SUM OF ALL RESPONSES TO PHQ QUESTIONS 1-9: 0
2. FEELING DOWN, DEPRESSED OR HOPELESS: 0
1. LITTLE INTEREST OR PLEASURE IN DOING THINGS: 0

## 2024-02-07 NOTE — PROGRESS NOTES
[place]:   [phone]    Mr. Jones 74 yo M with CAD and PCI to the RCA and OM 12/2022 with findings of diffuse distal LAD disease, severe aortic stenosis was noted by echocardiogram, essential hypertension, type 2 diabetes, mixed hyperlipidemia.      Since his last visit, overall he has been doing well cardiac-wise.  No exertional chest pain.  Breathing has been stable.  No significant palpitations.  He has lost weight, going from 231 to 215 pounds.  He stays active, working on the farm.  His biggest issue has been dealing with blood sugars and this has been flaring ever since the surgery.  He has been recently started on insulin and he is going to see endocrinologist in April, Dr. Durham.  He does have a follow up with the valve team in April, along with an echo there. He is compensated on exam with clear lungs, I-II/VI systolic murmur left sternal border.    Assessment and Plan:  1. Severe aortic stenosis, status post TAVR with Dr. Chopra in May 2023.  Overall stable and compensated. He has follow up with valve team in April, along with an echo.  Will let the valve team address his Plavix at that time.  2. Coronary artery disease, status post PCI to RCA and OM with drug eluting stents in 2022.  Stable cardiac-wise and will continue his current cardiac regimen.  Will have him follow back in six months.  3. Essential hypertension.  Blood pressure controlled.  4. Mixed hyperlipidemia.  Tolerating statin.  5. Type 2 diabetes.  This has been difficult to control.  They are going to see endocrinology, Dr. Durham, in April.  6. Venous insufficiency.  Has not been an issue.    He  has a past medical history of Diabetes (HCC), Hepatitis, Hypertension, Kidney stones, and Malaria.    All other systems negative except as above.     PE  Vitals:    02/07/24 1437   BP: 126/62   Site: Left Upper Arm   Position: Sitting   Cuff Size: Medium Adult   Pulse: 76   SpO2: 97%   Weight: 97.8 kg (215 lb 9.6

## 2024-02-07 NOTE — PATIENT INSTRUCTIONS
Dr. Suzette Tipton    [facility]  [address], [address], [place] [address] [phone]

## 2024-04-04 ENCOUNTER — OFFICE VISIT (OUTPATIENT)
Age: 74
End: 2024-04-04
Payer: MEDICARE

## 2024-04-04 ENCOUNTER — HOSPITAL ENCOUNTER (OUTPATIENT)
Facility: HOSPITAL | Age: 74
Discharge: HOME | End: 2024-04-06
Payer: MEDICARE

## 2024-04-04 VITALS
BODY MASS INDEX: 29.02 KG/M2 | SYSTOLIC BLOOD PRESSURE: 143 MMHG | DIASTOLIC BLOOD PRESSURE: 65 MMHG | HEART RATE: 65 BPM | OXYGEN SATURATION: 97 % | TEMPERATURE: 98.3 F | WEIGHT: 214 LBS

## 2024-04-04 VITALS — BODY MASS INDEX: 29.2 KG/M2 | HEIGHT: 72 IN | WEIGHT: 215.61 LBS

## 2024-04-04 DIAGNOSIS — I35.0 NONRHEUMATIC AORTIC VALVE STENOSIS: ICD-10-CM

## 2024-04-04 DIAGNOSIS — Z95.2 S/P TAVR (TRANSCATHETER AORTIC VALVE REPLACEMENT): Primary | ICD-10-CM

## 2024-04-04 PROCEDURE — G8427 DOCREV CUR MEDS BY ELIG CLIN: HCPCS | Performed by: NURSE PRACTITIONER

## 2024-04-04 PROCEDURE — 99214 OFFICE O/P EST MOD 30 MIN: CPT | Performed by: NURSE PRACTITIONER

## 2024-04-04 PROCEDURE — 93306 TTE W/DOPPLER COMPLETE: CPT

## 2024-04-04 PROCEDURE — G8417 CALC BMI ABV UP PARAM F/U: HCPCS | Performed by: NURSE PRACTITIONER

## 2024-04-04 PROCEDURE — 3017F COLORECTAL CA SCREEN DOC REV: CPT | Performed by: NURSE PRACTITIONER

## 2024-04-04 PROCEDURE — 1123F ACP DISCUSS/DSCN MKR DOCD: CPT | Performed by: NURSE PRACTITIONER

## 2024-04-04 PROCEDURE — 1036F TOBACCO NON-USER: CPT | Performed by: NURSE PRACTITIONER

## 2024-04-04 NOTE — PROGRESS NOTES
release tablet Take by mouth daily (Patient not taking: Reported on 2/7/2024)       No current facility-administered medications for this visit.       Vitals: Blood pressure (!) 143/65, pulse 65, temperature 98.3 °F (36.8 °C), temperature source Oral, weight 97.1 kg (214 lb), SpO2 97 %.       Allergies: is allergic to simvastatin.    Review of Systems: Pertinent Positives per HPI   [x]Total of 13 systems reviewed as follows:  Constitutional: Negative fever, negative chills  Eyes:   Negative for amauroses fugax  ENT:   Negative sore throat,oral abscess , +Soboba  Endocrine Negative for thyroid replacement Rx; goiter; DM  Respiratory:  Negative chronic cough,sputum production  Cards:   Negative for palpitations, varicosities, claudication, lower extremity edema  GI:   Negative for dysphagia, bleeding, nausea, vomiting, diarrhea, and abdominal pain  Genitourinary: Negative for frequency, dysuria  Integument:  Negative for rash and pruritus  Hematologic:  Negative for easy bruising; bleeding dyscrasia   Musculoskel: Negative for muscle weakness inhibiting ambulation  Neurological:  Negative for stroke, TIA, syncope, dizziness  Behavl/Psych: Negative for feelings of anxiety, depression     Cardiovascular Testing:     EKG: EKG 12 Lead  Order: [id number]  Status: Final result       Visible to patient: No (not released)       Next appt: 08/07/2024 at 01:40 PM in Cardiology (Boby Pérez MD)    0 Result Notes       Component  Ref Range & Units 5/5/23 1024 5/4/23 1024   Ventricular Rate  BPM 85 71   Atrial Rate  BPM 85 71   P-R Interval  ms 126 194   QRS Duration  ms 96 102   Q-T Interval  ms 374 420   QTc Calculation (Bazett)  ms 445 456   P Axis  degrees 55 63   R Axis  degrees 21 33   T Axis  degrees 171 -122   Diagnosis Normal sinus rhythm  Minimal voltage criteria for LVH, may be normal variant  Possible Inferior infarct (cited on or before 26-APR-2023)  T wave abnormality, consider anterolateral ischemia  When compared with

## 2024-04-05 LAB
ECHO AO ROOT DIAM: 2.3 CM
ECHO AO ROOT INDEX: 1.05 CM/M2
ECHO AV AREA PEAK VELOCITY: 2.4 CM2
ECHO AV AREA VTI: 1.8 CM2
ECHO AV AREA/BSA PEAK VELOCITY: 1.1 CM2/M2
ECHO AV AREA/BSA VTI: 0.8 CM2/M2
ECHO AV MEAN GRADIENT: 13 MMHG
ECHO AV MEAN VELOCITY: 1.7 M/S
ECHO AV PEAK GRADIENT: 20 MMHG
ECHO AV PEAK VELOCITY: 2.2 M/S
ECHO AV VELOCITY RATIO: 0.68
ECHO AV VTI: 48.2 CM
ECHO BSA: 2.23 M2
ECHO LV E' SEPTAL VELOCITY: 5 CM/S
ECHO LV EDV A2C: 123 ML
ECHO LV EDV A4C: 122 ML
ECHO LV EDV BP: 127 ML (ref 67–155)
ECHO LV EDV INDEX A4C: 56 ML/M2
ECHO LV EDV INDEX BP: 58 ML/M2
ECHO LV EDV NDEX A2C: 56 ML/M2
ECHO LV EJECTION FRACTION A2C: 16 %
ECHO LV EJECTION FRACTION A2C: 36 %
ECHO LV EJECTION FRACTION A4C: 44 %
ECHO LV EJECTION FRACTION A4C: 53 %
ECHO LV EJECTION FRACTION BIPLANE: 32 % (ref 55–100)
ECHO LV ESV A2C: 103 ML
ECHO LV ESV A4C: 68 ML
ECHO LV ESV BP: 86 ML (ref 22–58)
ECHO LV ESV INDEX A2C: 47 ML/M2
ECHO LV ESV INDEX A4C: 31 ML/M2
ECHO LV ESV INDEX BP: 39 ML/M2
ECHO LV GLOBAL LONGITUDINAL STRAIN (GLS): -11.8 %
ECHO LV GLOBAL LONGITUDINAL STRAIN (GLS): -12.5 %
ECHO LV GLOBAL LONGITUDINAL STRAIN (GLS): -16 %
ECHO LV GLOBAL LONGITUDINAL STRAIN (GLS): -9.7 %
ECHO LV INTERNAL DIMENSION DIASTOLIC MMODE: 4.7 CM (ref 4.2–5.9)
ECHO LV IVSD MMODE: 1.1 CM (ref 0.6–1)
ECHO LV IVSS MMODE: 0.9 CM
ECHO LV POSTERIOR WALL DIASTOLIC MMODE: 1.1 CM (ref 0.6–1)
ECHO LVOT AREA: 3.1 CM2
ECHO LVOT AV VTI INDEX: 0.63
ECHO LVOT DIAM: 2 CM
ECHO LVOT MEAN GRADIENT: 5 MMHG
ECHO LVOT PEAK GRADIENT: 8 MMHG
ECHO LVOT PEAK VELOCITY: 1.5 M/S
ECHO LVOT STROKE VOLUME INDEX: 43.4 ML/M2
ECHO LVOT SV: 95.1 ML
ECHO LVOT VTI: 30.3 CM
ECHO MV A VELOCITY: 1.31 M/S
ECHO MV AREA PHT: 2.2 CM2
ECHO MV AREA VTI: 2.2 CM2
ECHO MV E DECELERATION TIME (DT): 348.6 MS
ECHO MV E VELOCITY: 0.84 M/S
ECHO MV E/A RATIO: 0.64
ECHO MV LVOT VTI INDEX: 1.41
ECHO MV MAX VELOCITY: 1.5 M/S
ECHO MV MEAN GRADIENT: 3 MMHG
ECHO MV MEAN VELOCITY: 0.8 M/S
ECHO MV PEAK GRADIENT: 9 MMHG
ECHO MV PRESSURE HALF TIME (PHT): 101.1 MS
ECHO MV REGURGITANT PEAK GRADIENT: 18 MMHG
ECHO MV REGURGITANT PEAK VELOCITY: 2.1 M/S
ECHO MV VTI: 42.7 CM
ECHO PV MAX VELOCITY: 1.1 M/S
ECHO PV PEAK GRADIENT: 4 MMHG
ECHO RV FREE WALL PEAK S': 20 CM/S
ECHO RV TAPSE: 2.2 CM (ref 1.7–?)
ECHO TV REGURGITANT MAX VELOCITY: 1.31 M/S
ECHO TV REGURGITANT PEAK GRADIENT: 7 MMHG

## 2024-08-07 ENCOUNTER — OFFICE VISIT (OUTPATIENT)
Age: 74
End: 2024-08-07
Payer: MEDICARE

## 2024-08-07 VITALS
WEIGHT: 223 LBS | HEIGHT: 72 IN | OXYGEN SATURATION: 94 % | BODY MASS INDEX: 30.2 KG/M2 | SYSTOLIC BLOOD PRESSURE: 136 MMHG | DIASTOLIC BLOOD PRESSURE: 72 MMHG | HEART RATE: 82 BPM

## 2024-08-07 DIAGNOSIS — I25.10 ATHEROSCLEROSIS OF NATIVE CORONARY ARTERY OF NATIVE HEART WITHOUT ANGINA PECTORIS: ICD-10-CM

## 2024-08-07 DIAGNOSIS — E78.2 MIXED HYPERLIPIDEMIA: ICD-10-CM

## 2024-08-07 DIAGNOSIS — Z95.2 S/P TAVR (TRANSCATHETER AORTIC VALVE REPLACEMENT): ICD-10-CM

## 2024-08-07 DIAGNOSIS — I35.0 NONRHEUMATIC AORTIC VALVE STENOSIS: Primary | ICD-10-CM

## 2024-08-07 PROCEDURE — 99214 OFFICE O/P EST MOD 30 MIN: CPT | Performed by: INTERNAL MEDICINE

## 2024-08-07 PROCEDURE — 93005 ELECTROCARDIOGRAM TRACING: CPT | Performed by: INTERNAL MEDICINE

## 2024-08-07 ASSESSMENT — PATIENT HEALTH QUESTIONNAIRE - PHQ9
2. FEELING DOWN, DEPRESSED OR HOPELESS: NOT AT ALL
SUM OF ALL RESPONSES TO PHQ QUESTIONS 1-9: 0
SUM OF ALL RESPONSES TO PHQ QUESTIONS 1-9: 0
1. LITTLE INTEREST OR PLEASURE IN DOING THINGS: NOT AT ALL
SUM OF ALL RESPONSES TO PHQ9 QUESTIONS 1 & 2: 0
SUM OF ALL RESPONSES TO PHQ QUESTIONS 1-9: 0
SUM OF ALL RESPONSES TO PHQ QUESTIONS 1-9: 0

## 2024-08-07 NOTE — PROGRESS NOTES
[place]:   [phone]    Mr. Jones 75 yo M with CAD and PCI to the RCA and OM 12/2022 with findings of diffuse distal LAD disease, severe aortic stenosis was noted by echocardiogram, essential hypertension, type 2 diabetes, mixed hyperlipidemia.      Since last visit overall he has been doing well.  No exertional chest pain.  Breathing has been stable.  No significant palpitations.  He did lose a significant amount of weight this past year, but the last few months has gained a little bit of it back.  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  No exertional chest pain.  Breathing has been stable.  No significant palpitation.  He did get a checkup in the valve clinic a few months ago.  He is compensated on exam with clear lungs, I-II/VI systolic murmur left sternal border, unchanged.    Assessment/Plan:  1. Severe aortic stenosis, status post TAVR with Dr. Chopra in May 2023.  Overall continues to do well.  Will have him follow up in six months with a same-day echo.  At this point he can stop his Plavix and will continue 81 mg of aspirin.  He does know he needs to take prophylactic antibiotics with dental visits.    2. Coronary artery disease, status post PCI to RCA and OM with drug eluting stents in 2022.  Continue his current cardiac regimen.  3. Essential hypertension.  Blood pressure controlled.  4. Mixed hyperlipidemia.  Tolerating statin.  5. Type 2 diabetes.  Followed by endocrinology, Dr. Durham.  6. Venous insufficiency.  Has not been an issue.      He  has a past medical history of Diabetes (HCC), Hepatitis, Hypertension, Kidney stones, and Malaria.    All other systems negative except as above.     PE  Vitals:    08/07/24 1336   BP: 136/72   Site: Left Upper Arm   Position: Sitting   Cuff Size: Large Adult   Pulse: 82   SpO2: 94%   Weight: 101.2 kg (223 lb)   Height: 1.831 m (6' 0.1\")        Body mass index is 30.16 kg/m².  General appearance -

## 2025-02-25 ENCOUNTER — TELEPHONE (OUTPATIENT)
Age: 75
End: 2025-02-25

## 2025-02-25 NOTE — TELEPHONE ENCOUNTER
----- Message from Dr. Boby Pérez MD sent at 2/25/2025  8:28 AM EST -----  Please let pt know echo was overall normal. Normal LV function. Normal AVR function. Can keep scheduled follow up. thx

## 2025-04-28 ENCOUNTER — OFFICE VISIT (OUTPATIENT)
Age: 75
End: 2025-04-28
Payer: MEDICARE

## 2025-04-28 VITALS
HEIGHT: 72 IN | HEART RATE: 87 BPM | SYSTOLIC BLOOD PRESSURE: 158 MMHG | OXYGEN SATURATION: 97 % | DIASTOLIC BLOOD PRESSURE: 68 MMHG | WEIGHT: 234.2 LBS | BODY MASS INDEX: 31.72 KG/M2

## 2025-04-28 DIAGNOSIS — I10 BENIGN ESSENTIAL HTN: ICD-10-CM

## 2025-04-28 DIAGNOSIS — I25.10 ATHEROSCLEROSIS OF NATIVE CORONARY ARTERY OF NATIVE HEART WITHOUT ANGINA PECTORIS: ICD-10-CM

## 2025-04-28 DIAGNOSIS — Z79.4 TYPE 2 DIABETES MELLITUS WITHOUT COMPLICATION, WITH LONG-TERM CURRENT USE OF INSULIN (HCC): ICD-10-CM

## 2025-04-28 DIAGNOSIS — Z95.2 S/P TAVR (TRANSCATHETER AORTIC VALVE REPLACEMENT): ICD-10-CM

## 2025-04-28 DIAGNOSIS — I35.0 NONRHEUMATIC AORTIC VALVE STENOSIS: Primary | ICD-10-CM

## 2025-04-28 DIAGNOSIS — E11.9 TYPE 2 DIABETES MELLITUS WITHOUT COMPLICATION, WITH LONG-TERM CURRENT USE OF INSULIN (HCC): ICD-10-CM

## 2025-04-28 DIAGNOSIS — E78.2 MIXED HYPERLIPIDEMIA: ICD-10-CM

## 2025-04-28 PROCEDURE — 1036F TOBACCO NON-USER: CPT | Performed by: INTERNAL MEDICINE

## 2025-04-28 PROCEDURE — 99214 OFFICE O/P EST MOD 30 MIN: CPT | Performed by: INTERNAL MEDICINE

## 2025-04-28 PROCEDURE — G2211 COMPLEX E/M VISIT ADD ON: HCPCS | Performed by: INTERNAL MEDICINE

## 2025-04-28 PROCEDURE — G8417 CALC BMI ABV UP PARAM F/U: HCPCS | Performed by: INTERNAL MEDICINE

## 2025-04-28 PROCEDURE — 1126F AMNT PAIN NOTED NONE PRSNT: CPT | Performed by: INTERNAL MEDICINE

## 2025-04-28 PROCEDURE — 3077F SYST BP >= 140 MM HG: CPT | Performed by: INTERNAL MEDICINE

## 2025-04-28 PROCEDURE — G8427 DOCREV CUR MEDS BY ELIG CLIN: HCPCS | Performed by: INTERNAL MEDICINE

## 2025-04-28 PROCEDURE — 3078F DIAST BP <80 MM HG: CPT | Performed by: INTERNAL MEDICINE

## 2025-04-28 PROCEDURE — 1159F MED LIST DOCD IN RCRD: CPT | Performed by: INTERNAL MEDICINE

## 2025-04-28 PROCEDURE — 1123F ACP DISCUSS/DSCN MKR DOCD: CPT | Performed by: INTERNAL MEDICINE

## 2025-04-28 ASSESSMENT — PATIENT HEALTH QUESTIONNAIRE - PHQ9
2. FEELING DOWN, DEPRESSED OR HOPELESS: NOT AT ALL
SUM OF ALL RESPONSES TO PHQ QUESTIONS 1-9: 0
1. LITTLE INTEREST OR PLEASURE IN DOING THINGS: NOT AT ALL
SUM OF ALL RESPONSES TO PHQ QUESTIONS 1-9: 0

## 2025-04-28 NOTE — PROGRESS NOTES
1. Have you been to the ER, urgent care clinic since your last visit?  Hospitalized since your last visit?No    2. Have you seen or consulted any other health care providers outside of the [facility] since your last visit?  Include any pap smears or colon screening. No

## 2025-04-28 NOTE — PROGRESS NOTES
[facility] [place]:   [phone]    Mr. Jones 74 yo M with CAD and PCI to the RCA and OM 12/2022 with findings of diffuse distal LAD disease, severe s/p TAVR 5/2023, HTN, DM2, mixed hyperlipidemia.      Since his last visit, overall he has been doing well cardiac-wise.  No exertional chest pain.  Breathing has been stable.  No significant palpitations.  Mostly having issues with his left hip.  He noted some difficulty recovering from his right knee surgery, so he is little leery or apprehensive about doing anything about his left hip.  I did recommend he see his orthopedist.  His weight is up slightly compared to last time.  In general, in the past [place] farm.  He is compensated on exam with clear lungs, I-II/VI systolic murmur at the left sternal border, unchanged. Echo from last month demonstrated preserved LV systolic function and normal function of his TAVR.      Assessment and Plan:  1. Severe aortic stenosis status post TAVR with Dr. Chopra in May 2023.  Stable and compensated.  Will have him follow back in 6 months.  Consider repeat echo in 1 year. Continue aspirin and he needs to take prophylactic antibiotics before surgeries or procedures.  2. Coronary artery disease status post PCI to the RCA and OM with drug eluting stent in 2022.  Continue his current medical regimen.  3. Essential hypertension.  Blood pressure is controlled and no changes made.  4. Mixed hyperlipidemia.  Tolerating statin.  Goal LDL less than 70.  5. Type 2 diabetes.  Followed by Endocrinology, Dr. Durham.  6. Venous insufficiency.      He  has a past medical history of Diabetes (HCC), Hepatitis, Hypertension, Kidney stones, and Malaria.    All other systems negative except as above.     PE  Vitals:    04/28/25 1259   BP: (!) 158/68   BP Site: Left Upper Arm   Patient Position: Sitting   BP Cuff Size: Large Adult   Pulse: 87   SpO2: 97%   Weight: 106.2 kg (234 lb 3.2 oz)   Height: 1.829 m (6')        Body

## (undated) DEVICE — CATHETER KIT JL 4 JL5 6 FRX100 CM 110 CM 145 PGTL DXTERITY

## (undated) DEVICE — CATH 5F 100CM JR40 -- DXTERITY

## (undated) DEVICE — PREP SKN CHLRAPRP APL 26ML STR --

## (undated) DEVICE — PINNACLE INTRODUCER SHEATH: Brand: PINNACLE

## (undated) DEVICE — GLOVE SURG SZ 8 CRM LTX FREE POLYISOPRENE POLYMER BEAD ANTI

## (undated) DEVICE — 3M™ TEGADERM™ TRANSPARENT FILM DRESSING FRAME STYLE, 1626W, 4 IN X 4-3/4 IN (10 CM X 12 CM), 50/CT 4CT/CASE: Brand: 3M™ TEGADERM™

## (undated) DEVICE — PACK PROCEDURE SURG HRT CATH

## (undated) DEVICE — STIFFEN MICRO-INTRODUCER KIT: Brand: STIFFEN MICRO-INTRODUCER KIT

## (undated) DEVICE — WASTE KIT - ST MARY: Brand: MEDLINE INDUSTRIES, INC.

## (undated) DEVICE — REM POLYHESIVE ADULT PATIENT RETURN ELECTRODE: Brand: VALLEYLAB

## (undated) DEVICE — GUIDEWIRE VASC L150CM DIA0.035IN FLX TIP L7CM PTFE STR FIX

## (undated) DEVICE — CUSTOM KT PTCA INFL DEV K05 00052M

## (undated) DEVICE — CATH 5F 100CM JL35 -- DXTERITY

## (undated) DEVICE — RUNTHROUGH NS EXTRA FLOPPY PTCA GUIDEWIRE: Brand: RUNTHROUGH

## (undated) DEVICE — CATH GUID COR EB375 6FR 100CM -- LAUNCHER

## (undated) DEVICE — 6 FOOT DISPOSABLE EXTENSION CABLE WITH SAFE CONNECT / SCREW-DOWN

## (undated) DEVICE — CATH GUID COR AL75 6FR 100CM -- LAUNCHER

## (undated) DEVICE — KIT HND CTRL 3 W STPCOCK ROT END 54IN PREM HI PRSS TBNG AT

## (undated) DEVICE — CATH ANGI BLLN DIL 3.5X12MM -- NC EUPHORA

## (undated) DEVICE — CATH BLLN DIL 3.0 X12MM RX -- EUPHORA

## (undated) DEVICE — ANGIOGRAPHY KIT

## (undated) DEVICE — WRAP SURG W1.31XL1.34M CARD FOR PT 165-172CM THERMOWRP

## (undated) DEVICE — KIT MED IMAG CNTRST AGNT W/ IOPAMIDOL REUSE

## (undated) DEVICE — CATHETER DIAG 5FR L110CM PIG CRV SZ 6 SIDE H DBL BRAID WIRE

## (undated) DEVICE — PROVE COVER: Brand: UNBRANDED

## (undated) DEVICE — HI-TORQUE VERSACORE MODIFIED J GUIDE WIRE SYSTEM 145 CM: Brand: HI-TORQUE VERSACORE

## (undated) DEVICE — GDWIRE STR SUP STF 0.035INX180 --

## (undated) DEVICE — GUIDEWIRE VASC L260CM DIA0.035IN TIP L3MM STD EXCHG PTFE J

## (undated) DEVICE — VALVE HEMSTAS GUIDEWIRE INSRTN TOOL TORQUE DEV GRDIAN II NC

## (undated) DEVICE — RADIFOCUS GLIDECATH: Brand: GLIDECATH

## (undated) DEVICE — SYR 50ML LR LCK 1ML GRAD NSAF --

## (undated) DEVICE — COVER,LIGHT,CAMERA,HARD,1/PK,STRL: Brand: MEDLINE

## (undated) DEVICE — TR BAND RADIAL ARTERY COMPRESSION DEVICE: Brand: TR BAND

## (undated) DEVICE — GUIDEWIRE VASC L260CM DIA0.035IN TIP L7CM PTFE S STL STR

## (undated) DEVICE — GUIDEWIRE VASC L260CM DIA0.035IN RAD 3MM J TIP L7CM PTFE

## (undated) DEVICE — SYR LR LCK 1ML GRAD NSAF 30ML --

## (undated) DEVICE — 1000ML,PRESSURE INFUSER W/STOPCOCK: Brand: MEDLINE

## (undated) DEVICE — OPEN HEART A- RICHMOND: Brand: MEDLINE INDUSTRIES, INC.

## (undated) DEVICE — PERCLOSE PROGLIDE™ SUTURE-MEDIATED CLOSURE SYSTEM: Brand: PERCLOSE PROGLIDE™

## (undated) DEVICE — GLIDESHEATH SLENDER STAINLESS STEEL KIT: Brand: GLIDESHEATH SLENDER

## (undated) DEVICE — KIT MFLD ISOLATN NACL CNTRST PRT TBNG SPIK W/ PRSS TRNSDUC

## (undated) DEVICE — C-ARM: Brand: UNBRANDED

## (undated) DEVICE — CATHETER DIAG 5FR L100CM AL AL 1.0 CRV SZ DBL BRAID WIRE

## (undated) DEVICE — SPECIAL PROCEDURE DRAPE 32" X 34": Brand: SPECIAL PROCEDURE DRAPE